# Patient Record
Sex: MALE | Race: WHITE | NOT HISPANIC OR LATINO | Employment: UNEMPLOYED | URBAN - METROPOLITAN AREA
[De-identification: names, ages, dates, MRNs, and addresses within clinical notes are randomized per-mention and may not be internally consistent; named-entity substitution may affect disease eponyms.]

---

## 2019-12-30 ENCOUNTER — HOSPITAL ENCOUNTER (INPATIENT)
Facility: REHABILITATION | Age: 69
End: 2019-12-30
Attending: PHYSICAL MEDICINE & REHABILITATION | Admitting: PHYSICAL MEDICINE & REHABILITATION

## 2020-01-31 ENCOUNTER — HOSPITAL ENCOUNTER (OUTPATIENT)
Dept: HOSPITAL 8 - CFH | Age: 70
Discharge: HOME | End: 2020-01-31
Attending: NURSE PRACTITIONER
Payer: SELF-PAY

## 2020-01-31 DIAGNOSIS — I99.8: ICD-10-CM

## 2020-01-31 DIAGNOSIS — J34.89: ICD-10-CM

## 2020-01-31 DIAGNOSIS — I63.89: Primary | ICD-10-CM

## 2020-01-31 PROCEDURE — 70450 CT HEAD/BRAIN W/O DYE: CPT

## 2020-07-24 ENCOUNTER — HOSPITAL ENCOUNTER (OUTPATIENT)
Facility: MEDICAL CENTER | Age: 70
End: 2020-07-30
Attending: EMERGENCY MEDICINE | Admitting: INTERNAL MEDICINE
Payer: MEDICAID

## 2020-07-24 ENCOUNTER — APPOINTMENT (OUTPATIENT)
Dept: RADIOLOGY | Facility: MEDICAL CENTER | Age: 70
End: 2020-07-24
Attending: EMERGENCY MEDICINE
Payer: MEDICAID

## 2020-07-24 DIAGNOSIS — R62.7 FAILURE TO THRIVE IN ADULT: ICD-10-CM

## 2020-07-24 DIAGNOSIS — R62.7 ADULT FAILURE TO THRIVE: ICD-10-CM

## 2020-07-24 DIAGNOSIS — Z86.73 HISTORY OF CVA (CEREBROVASCULAR ACCIDENT): ICD-10-CM

## 2020-07-24 LAB
ALBUMIN SERPL BCP-MCNC: 4.7 G/DL (ref 3.2–4.9)
ALBUMIN/GLOB SERPL: 2 G/DL
ALP SERPL-CCNC: 80 U/L (ref 30–99)
ALT SERPL-CCNC: 26 U/L (ref 2–50)
ANION GAP SERPL CALC-SCNC: 16 MMOL/L (ref 7–16)
APPEARANCE UR: CLEAR
AST SERPL-CCNC: 18 U/L (ref 12–45)
BASOPHILS # BLD AUTO: 0.3 % (ref 0–1.8)
BASOPHILS # BLD: 0.03 K/UL (ref 0–0.12)
BILIRUB SERPL-MCNC: 0.8 MG/DL (ref 0.1–1.5)
BILIRUB UR QL STRIP.AUTO: NEGATIVE
BUN SERPL-MCNC: 15 MG/DL (ref 8–22)
CALCIUM SERPL-MCNC: 10.1 MG/DL (ref 8.5–10.5)
CHLORIDE SERPL-SCNC: 100 MMOL/L (ref 96–112)
CO2 SERPL-SCNC: 21 MMOL/L (ref 20–33)
COLOR UR: YELLOW
CREAT SERPL-MCNC: 0.79 MG/DL (ref 0.5–1.4)
EOSINOPHIL # BLD AUTO: 0.07 K/UL (ref 0–0.51)
EOSINOPHIL NFR BLD: 0.7 % (ref 0–6.9)
ERYTHROCYTE [DISTWIDTH] IN BLOOD BY AUTOMATED COUNT: 41.3 FL (ref 35.9–50)
GLOBULIN SER CALC-MCNC: 2.4 G/DL (ref 1.9–3.5)
GLUCOSE SERPL-MCNC: 132 MG/DL (ref 65–99)
GLUCOSE UR STRIP.AUTO-MCNC: NEGATIVE MG/DL
HCT VFR BLD AUTO: 49.6 % (ref 42–52)
HGB BLD-MCNC: 16.5 G/DL (ref 14–18)
IMM GRANULOCYTES # BLD AUTO: 0.02 K/UL (ref 0–0.11)
IMM GRANULOCYTES NFR BLD AUTO: 0.2 % (ref 0–0.9)
KETONES UR STRIP.AUTO-MCNC: 40 MG/DL
LEUKOCYTE ESTERASE UR QL STRIP.AUTO: NEGATIVE
LIPASE SERPL-CCNC: 25 U/L (ref 11–82)
LYMPHOCYTES # BLD AUTO: 1.53 K/UL (ref 1–4.8)
LYMPHOCYTES NFR BLD: 15.3 % (ref 22–41)
MCH RBC QN AUTO: 28 PG (ref 27–33)
MCHC RBC AUTO-ENTMCNC: 33.3 G/DL (ref 33.7–35.3)
MCV RBC AUTO: 84.2 FL (ref 81.4–97.8)
MICRO URNS: ABNORMAL
MONOCYTES # BLD AUTO: 0.58 K/UL (ref 0–0.85)
MONOCYTES NFR BLD AUTO: 5.8 % (ref 0–13.4)
NEUTROPHILS # BLD AUTO: 7.75 K/UL (ref 1.82–7.42)
NEUTROPHILS NFR BLD: 77.7 % (ref 44–72)
NITRITE UR QL STRIP.AUTO: NEGATIVE
NRBC # BLD AUTO: 0 K/UL
NRBC BLD-RTO: 0 /100 WBC
PH UR STRIP.AUTO: 6 [PH] (ref 5–8)
PLATELET # BLD AUTO: 188 K/UL (ref 164–446)
PMV BLD AUTO: 9.8 FL (ref 9–12.9)
POC BREATHALIZER: 0 PERCENT (ref 0–0.01)
POTASSIUM SERPL-SCNC: 4.1 MMOL/L (ref 3.6–5.5)
PROT SERPL-MCNC: 7.1 G/DL (ref 6–8.2)
PROT UR QL STRIP: NEGATIVE MG/DL
RBC # BLD AUTO: 5.89 M/UL (ref 4.7–6.1)
RBC UR QL AUTO: NEGATIVE
SODIUM SERPL-SCNC: 137 MMOL/L (ref 135–145)
SP GR UR STRIP.AUTO: 1.03
UROBILINOGEN UR STRIP.AUTO-MCNC: 1 MG/DL
WBC # BLD AUTO: 10 K/UL (ref 4.8–10.8)

## 2020-07-24 PROCEDURE — 99220 PR INITIAL OBSERVATION CARE,LEVL III: CPT | Performed by: INTERNAL MEDICINE

## 2020-07-24 PROCEDURE — 85025 COMPLETE CBC W/AUTO DIFF WBC: CPT

## 2020-07-24 PROCEDURE — 83690 ASSAY OF LIPASE: CPT

## 2020-07-24 PROCEDURE — G0378 HOSPITAL OBSERVATION PER HR: HCPCS

## 2020-07-24 PROCEDURE — 81003 URINALYSIS AUTO W/O SCOPE: CPT

## 2020-07-24 PROCEDURE — 99285 EMERGENCY DEPT VISIT HI MDM: CPT

## 2020-07-24 PROCEDURE — 700105 HCHG RX REV CODE 258: Performed by: EMERGENCY MEDICINE

## 2020-07-24 PROCEDURE — 302970 POC BREATHALIZER: Performed by: EMERGENCY MEDICINE

## 2020-07-24 PROCEDURE — 302970 POC BREATHALIZER

## 2020-07-24 PROCEDURE — 80053 COMPREHEN METABOLIC PANEL: CPT

## 2020-07-24 PROCEDURE — 71045 X-RAY EXAM CHEST 1 VIEW: CPT

## 2020-07-24 PROCEDURE — C9803 HOPD COVID-19 SPEC COLLECT: HCPCS | Performed by: INTERNAL MEDICINE

## 2020-07-24 RX ORDER — AMOXICILLIN 250 MG
2 CAPSULE ORAL 2 TIMES DAILY
Status: DISCONTINUED | OUTPATIENT
Start: 2020-07-24 | End: 2020-07-30 | Stop reason: HOSPADM

## 2020-07-24 RX ORDER — SODIUM CHLORIDE 9 MG/ML
500 INJECTION, SOLUTION INTRAVENOUS ONCE
Status: COMPLETED | OUTPATIENT
Start: 2020-07-24 | End: 2020-07-24

## 2020-07-24 RX ORDER — BISACODYL 10 MG
10 SUPPOSITORY, RECTAL RECTAL
Status: DISCONTINUED | OUTPATIENT
Start: 2020-07-24 | End: 2020-07-30 | Stop reason: HOSPADM

## 2020-07-24 RX ORDER — ACETAMINOPHEN 325 MG/1
650 TABLET ORAL EVERY 6 HOURS PRN
Status: DISCONTINUED | OUTPATIENT
Start: 2020-07-24 | End: 2020-07-30 | Stop reason: HOSPADM

## 2020-07-24 RX ORDER — POLYETHYLENE GLYCOL 3350 17 G/17G
1 POWDER, FOR SOLUTION ORAL
Status: DISCONTINUED | OUTPATIENT
Start: 2020-07-24 | End: 2020-07-30 | Stop reason: HOSPADM

## 2020-07-24 RX ADMIN — SODIUM CHLORIDE 500 ML: 9 INJECTION, SOLUTION INTRAVENOUS at 18:35

## 2020-07-24 SDOH — HEALTH STABILITY: MENTAL HEALTH: HOW OFTEN DO YOU HAVE A DRINK CONTAINING ALCOHOL?: NEVER

## 2020-07-24 NOTE — ED PROVIDER NOTES
"ED Provider Note    Scribed for Laura Loving M.D. by Herve Mata. 7/24/2020  1:54 PM    Primary care provider: None reported  Means of arrival: EMS  History obtained from: Patient  History limited by: Difficulty speaking due to old stroke  CHIEF COMPLAINT  Chief Complaint   Patient presents with   • Legal 2000     unable to care for self, left AMA at [facility]  Chuy Luis is a 70 y.o. male with a reported history of stroke, who presents to the ED via EMS for inability to care for self. Per nurse, the patient has been at [facility] since the beginning of the year and has history of a stroke. RN history was obtained by EMS and the MOST team SW who placed patient on a legal hold for inability to care for self. Patient tried to escape from the facility as he does not like living there. Nurse said the patient almost made it to the highway before he was caught and placed on a legal hold. The MOST  was concerned for the patient's safety and mental capacity and his ability to make decisions for himself as he could not verbalize to her how he planned on caring for himself. He has minimal belongings with him, a walker, no money and no plan as to where he was going to get shelter, food etc.  EMS was called to transfer the patient to the ED. The patient appears to have difficulty speaking and articulating (?due to old stroke) so history is difficulty to obtain.  However, patient seems to understand questions he is being asked and tries to answer to the best of his ability.  He does well with \"yes/no\" questions.  He denies any shortness of breath. No chest pain. No dizziness. No fever, vomiting or diarrhea. No cough. No headache.  Patient denies headache and does not feel sick.  He has no wife, children, or any family in town. He does not have a home in [place]. He says he does not have any friends or anybody to call. He indicates that he is currently getting income from his social " security, but cannot tell me how much although at one point he said he gets $2000 and then on another occasion he says he gets $1000.  Patient notes he has not been drinking and eating much lately and when offered food/water, he seemed very pleased.  He also seemed to get agitated when I asked him if he was happy at [facility].  When asked if he would like to live someone else, he calmed down and seemed to agree that is a good idea.  He seems relieved when I suggested that we could find him a place to live where he might be more happy. Again, history is difficult to obtain due to speech disturbance.    Further history of present illness cannot be obtained due to the patient's difficulty speaking, likely secondary an old stroke, but no old records in the [facility] system.    REVIEW OF SYSTEMS  See HPI for further details.   Pertinent positives include: Inability to care for self.   Pertinent negatives include: He denies any shortness of breath. No chest pain. No dizziness. No fever, vomiting or diarrhea    Further ROS cannot be obtained due to the patient's difficulty speaking due to an old stroke.    PAST MEDICAL HISTORY  History reviewed. No pertinent past medical history.    FAMILY HISTORY  History reviewed. No pertinent family history.    SOCIAL HISTORY  Social History     Tobacco Use   • Smoking status: Never Smoker   • Smokeless tobacco: Never Used   Substance Use Topics   • Alcohol use: Never     Frequency: Never   • Drug use: Never      Social History     Substance and Sexual Activity   Drug Use Never       SURGICAL HISTORY  History reviewed. No pertinent surgical history.    CURRENT MEDICATIONS  Home Medications     Reviewed by Genet Jones R.N. (Registered Nurse) on 07/24/20 at 1309  Med List Status: Unable to Obtain   Medication Last Dose Status        Patient Saul Taking any Medications                       ALLERGIES  No Known Allergies    PHYSICAL EXAM  VITAL SIGNS: /82   Pulse (!) 112   Temp  "37.4 °C (99.3 °F) (Temporal)   Resp 18   Ht 1.93 m (6' 4\")   Wt 68 kg (150 lb)   SpO2 98%   BMI 18.26 kg/m²      Constitutional: Well developed, well nourished; No acute distress; Non-toxic appearance.   HENT: Normocephalic, atraumatic; Bilateral external ears normal; Oropharynx with dry mucous membranes;   Eyes: PERRL, EOMI, Conjunctiva normal. No discharge.   Neck:  Supple, nontender midline; No stridor; No nuchal rigidity.   Lymphatic: No cervical lymphadenopathy noted.   Cardiovascular: Regular rate and rhythm without murmurs, rubs, or gallop.   Thorax & Lungs: No respiratory distress, breath sounds clear to auscultation bilaterally without wheezing, rales or rhonchi. Nontender chest wall. No crepitus or subcutaneous air  Abdomen: Soft, nontender, bowel sounds normal. No obvious masses; No pulsatile masses; no rebound, guarding, or peritoneal signs.   Skin: Good color; warm and dry without rash or petechia.  Back: Nontender, No CVA tenderness.   Extremities: Distal radial, dorsalis pedis, posterior tibial pulses are equal bilaterally; No edema; Nontender calves or saphenous, No cyanosis, No clubbing.   Neurologic: Alert & oriented, Right sided weakness, which is chronic with contractures of his right upper and lower extremities. Difficulty speaking likely due to an old stroke.  Some words are intelligible and others are not intelligible.  Some statements consist of word salad.  Patient seems able to understand what I am saying.  It is easiest for him to answer yes/no questions.    LABS/RADIOLOGY/PROCEDURES  Results for orders placed or performed during the hospital encounter of 07/24/20   CBC WITH DIFFERENTIAL   Result Value Ref Range    WBC 10.0 4.8 - 10.8 K/uL    RBC 5.89 4.70 - 6.10 M/uL    Hemoglobin 16.5 14.0 - 18.0 g/dL    Hematocrit 49.6 42.0 - 52.0 %    MCV 84.2 81.4 - 97.8 fL    MCH 28.0 27.0 - 33.0 pg    MCHC 33.3 (L) 33.7 - 35.3 g/dL    RDW 41.3 35.9 - 50.0 fL    Platelet Count 188 164 - 446 K/uL "    MPV 9.8 9.0 - 12.9 fL    Neutrophils-Polys 77.70 (H) 44.00 - 72.00 %    Lymphocytes 15.30 (L) 22.00 - 41.00 %    Monocytes 5.80 0.00 - 13.40 %    Eosinophils 0.70 0.00 - 6.90 %    Basophils 0.30 0.00 - 1.80 %    Immature Granulocytes 0.20 0.00 - 0.90 %    Nucleated RBC 0.00 /100 WBC    Neutrophils (Absolute) 7.75 (H) 1.82 - 7.42 K/uL    Lymphs (Absolute) 1.53 1.00 - 4.80 K/uL    Monos (Absolute) 0.58 0.00 - 0.85 K/uL    Eos (Absolute) 0.07 0.00 - 0.51 K/uL    Baso (Absolute) 0.03 0.00 - 0.12 K/uL    Immature Granulocytes (abs) 0.02 0.00 - 0.11 K/uL    NRBC (Absolute) 0.00 K/uL   COMP METABOLIC PANEL   Result Value Ref Range    Sodium 137 135 - 145 mmol/L    Potassium 4.1 3.6 - 5.5 mmol/L    Chloride 100 96 - 112 mmol/L    Co2 21 20 - 33 mmol/L    Anion Gap 16.0 7.0 - 16.0    Glucose 132 (H) 65 - 99 mg/dL    Bun 15 8 - 22 mg/dL    Creatinine 0.79 0.50 - 1.40 mg/dL    Calcium 10.1 8.5 - 10.5 mg/dL    AST(SGOT) 18 12 - 45 U/L    ALT(SGPT) 26 2 - 50 U/L    Alkaline Phosphatase 80 30 - 99 U/L    Total Bilirubin 0.8 0.1 - 1.5 mg/dL    Albumin 4.7 3.2 - 4.9 g/dL    Total Protein 7.1 6.0 - 8.2 g/dL    Globulin 2.4 1.9 - 3.5 g/dL    A-G Ratio 2.0 g/dL   LIPASE   Result Value Ref Range    Lipase 25 11 - 82 U/L   URINALYSIS (UA)    Specimen: Urine, Clean Catch; Blood   Result Value Ref Range    Color Yellow     Character Clear     Specific Gravity 1.026 <1.035    Ph 6.0 5.0 - 8.0    Glucose Negative Negative mg/dL    Ketones 40 (A) Negative mg/dL    Protein Negative Negative mg/dL    Bilirubin Negative Negative    Urobilinogen, Urine 1.0 Negative    Nitrite Negative Negative    Leukocyte Esterase Negative Negative    Occult Blood Negative Negative    Micro Urine Req see below    ESTIMATED GFR   Result Value Ref Range    GFR If African American >60 >60 mL/min/1.73 m 2    GFR If Non African American >60 >60 mL/min/1.73 m 2   POC BREATHALIZER   Result Value Ref Range    POC Breathalizer 0.00 0.00 - 0.01 Percent          DX-CHEST-PORTABLE (1 VIEW)   Final Result      No acute cardiopulmonary disease.          COURSE & MEDICAL DECISION MAKING  Pertinent Labs & Imaging studies reviewed. (See chart for details)    Reviewed patient's old medical records which showed that the last time he was here at [facility] was 10 years ago.    1:54 PM - Patient seen and examined at bedside. Discussed plan of care.  I informed the patient the need for labs and radiology to rule out any emergent processes. Currently awaiting labs and radiology results before deciding if intervention is necessary. Patient will be informed on the results once I have reviewed them.     HYDRATION: Based on the patient's presentation of Other Tachycardia and dehydration the patient was given IV fluids. IV Hydration was used because oral hydration was not adequate alone. Upon recheck following hydration, the patient was improved.    3:01 PM Paged Hospitalist.     3:20 PM Spoke with Dr. Kaiser, hospitalist on-call, about the patient's condition.  He will kindly evaluate the patient hospitalization    3:04 PM- Spoke to social work. She said the patient was allowed to leave [facility] because he was not deemed incompetent. He also has no guardian or power of . The MOST worker did report that the patient has a history of stroke. He has a  in the community through adult protective services. The  has reached out but has not heard back from them yet. Patient could not articulate how he plans to care for himself, where he plans to go, or how he plans to pay for anything. The  feel that the patient needed to be hospitalized for placement and possible discussion of finding a guardian for the patient. Patient has no family or friends.     I verified that the patient was wearing a mask and I was wearing appropriate PPE every time I entered the room. The patient's mask was on the patient at all times during my encounter except for a brief  view of the oropharynx.    Patient presents by ambulance on a legal 2000 after he intentionally walked out of [facility] because he does not want to live there anymore.  The patient was seen by a  from the [facility] team.  The patient cannot verbalize how he was going to care for himself.  He could not realize how he was going to pay for things.  He does not have any friends or family.  He was allowed to leave [facility] because he has not been declared incompetent and does not have any power of  or guardian who they could have contacted to try to stop him.  The patient says he does not want to go back to [facility].  He does not like it there.  The patient presents with a walker and a few belongings.  Otherwise he does not have any other items with him that he could use to adequately care for himself.  He has no money.  He tells me that he gets some money from Social Security.  At one time he said it was $2000.  And on another occasion he told me it was $1000.  He has no friends and no family.  At this time I think it is reasonable to keep him on the legal hold as he has no reasonable plan as to how he is getting care for himself now that he has eloped from the [facility] facility.  He has no medical complaints.  He does not feel sick or ill.  He is not in any pain.  Patient does have a difficult time communicating due to old stroke.  He has weakness on the right side of his body.  He has some contractures of his right upper extremity and his right lower extremity.  He understands what I am saying and attempts to verbalize his answers, but some of his words are intelligible and sometimes his statements are just word salad.  I think this is likely secondary to his stroke.  He has no complaints of fevers, chills, cough, chest pain, abdominal pain, shortness of breath or headache.  He says he does not think he is eating and drinking enough.  He welcomes food and drink here in the  ER.  He got very upset when I asked him if he wanted to go back to [facility] and seemed pleased and relieved when I suggested that we try to find him a place to live where he might be happier.  The social workers have been involved in the patient's case.  He has a  through Adult Protective Services.  A voicemail has been left asking that she call back as we hopefully can get some more information from her.  However, our social workers recommend that the patient be admitted to the hospital for placement.  Our , Loreta, even suggested that we try to arrange for guardianship during this hospitalization.  This time he will remain on a legal 2000.  I discussed the case with Dr. Kaiser, hospitalist on-call, and he will kindly assign 1 of his partners evaluate the patient hospitalization.    FINAL IMPRESSION  1. Failure to thrive in adult Acute        This dictation has been created using voice recognition software. The accuracy of the dictation is limited by the abilities of the software. I expect there may be some errors of grammar and possibly content. I made every attempt to manually correct the errors within my dictation. However, errors related to voice recognition software may still exist and should be interpreted within the appropriate context.     Herve LOMELI (Tawnyaiblisa), am scribing for, and in the presence of, Laura Loving M.D..    Electronically signed by: Herve Mata (Phil), 7/24/2020    Laura LOMELI M.D. personally performed the services described in this documentation, as scribed by Herve Mata in my presence, and it is both accurate and complete. C.    The note accurately reflects work and decisions made by me.  Laura Loving M.D.  7/24/2020  3:26 PM

## 2020-07-24 NOTE — DISCHARGE PLANNING
Alysa [phone]  from the [facility] called and stated that she has been working with Sonya Cheung and getting the Pt placement in the Group Home.     Alysa will be back to work on 07-.  Please call her regarding Pt and she will work with Discharge Planning and getting the Patient into a Group Home.

## 2020-07-24 NOTE — ED NOTES
Med rec completed per pt's MAR from [facility]  Per MAR pt was not taking any daily medications at care facility   Allergies reviewed

## 2020-07-24 NOTE — ED NOTES
Chief Complaint   Patient presents with   • Legal 2000     unable to care for self, left AMA at [facility]     Pt bib ems , pt resides at [facility], pt history of stroke with right sided deficit. Pt said that he uses wheelchair. Pt [place] at [facility] , he said he does not want to live there. He does not have any family here.   PD placed pt on hold for unable to care self.

## 2020-07-24 NOTE — PROGRESS NOTES
Patient with hx of CVA residual right sided weakness and dysarthria    Patient left [facility]    On legal hold due to inability to care for self    Admit for failure to thrive and placement    Dr. Bender to admit

## 2020-07-24 NOTE — ASSESSMENT & PLAN NOTE
Patient with previous stroke since January, has right-sided hemiparesis and improving aphasia.  The patient today is very angry about the fact that he is still here and wants to leave AGAINST MEDICAL ADVICE.  Psychiatry at this point has deemed him incompetent to make medical decision making, but no legal hold in place.    7/28:  Ordered DME  since patient clearly states he is able to care for himself, [place].  Despite the patient's partial aphasia and right hemiplegia, he is quite clear that he can get food[place]  He is refusing all housing attempts.  He was brought in by EMS after seen [place] the freeway.  No suicidal or homicidal behavior.

## 2020-07-24 NOTE — DISCHARGE PLANNING
EUSEBIA had a Voice Message from the MOST Team stating Pt was coming to the ED on a Legal Hold.     Pt left AMA from [facility] . Most Team states he is unable to care for self, he does not have a place to live and he can not verbalize a plan for his care and how he will meet his needs. They are concerned over Pt mental capacity and his ability to make decisions for his self . Pt has no belongings, unable to articulate his finances, has no family support.     EUSEBIA has left message with SW at [facility] to try to obtain more information. MOST Team is attempting to contact APS to see if Pt has a current .     SW will continue to seek information on this Pt.      Most Team has notified SW that Pt has a  with APS.  Alysa Mims [phone]. EUSEBIA has left a message.

## 2020-07-24 NOTE — H&P
Hospital Medicine History & Physical Note    Date of Service  7/24/2020    Primary Care Physician  No primary care provider on file.    Code Status  No Order    Chief Complaint  Chief Complaint   Patient presents with   • Legal 2000     unable to care for self, left AMA at [facility]       History of Presenting Illness  70 y.o. male who presented to the hospital on 7/24/2020 due to unable to take care of himself.  He was brought into the hospital and he was placed on legal hold prior to his presentation and he left against medical care.  I evaluated him at the bedside I was unable to obtain HPI as patient told me that [place] and he was unable to provide me detailed information.  He has contracture on his right hand and he reported he has it for a long time.  I discussed with ER physician Dr. Loving regarding his current medical condition.  He expressed to Dr. Loving that he has not been eating food and he does not have money.    HPI is very limited as I was unable to obtain detailed information from the patient.    Review of Systems  Review of Systems   Unable to perform ROS: Mental acuity       Past Medical History  I was unable to review past medical history with patient    Surgical History  I was unable to review past surgical history with patient    Family History  I was unable to review family history with patient    Social History   reports that he has never smoked. He has never used smokeless tobacco. He reports that he does not drink alcohol or use drugs.    Allergies  No Known Allergies    Medications  None       Physical Exam  Temp:  [37.4 °C (99.3 °F)] 37.4 °C (99.3 °F)  Pulse:  [103-112] 103  Resp:  [18] 18  BP: (126-145)/(64-82) 126/64  SpO2:  [96 %-98 %] 96 %    Physical Exam  Vitals signs reviewed.   Constitutional:       General: He is not in acute distress.  HENT:      Head: Normocephalic and atraumatic. No contusion.      Right Ear: External ear normal.       Left Ear: External ear normal.      Nose: Nose normal.      Mouth/Throat:      Mouth: Mucous membranes are dry.      Pharynx: No oropharyngeal exudate.   Eyes:      General:         Right eye: No discharge.         Left eye: No discharge.      Pupils: Pupils are equal, round, and reactive to light.   Neck:      Musculoskeletal: No neck rigidity or muscular tenderness.   Cardiovascular:      Rate and Rhythm: Normal rate and regular rhythm.      Heart sounds: No murmur. No friction rub. No gallop.    Pulmonary:      Effort: Pulmonary effort is normal.      Breath sounds: No wheezing or rhonchi.   Abdominal:      General: Bowel sounds are normal. There is no distension.      Palpations: Abdomen is soft.      Tenderness: There is no abdominal tenderness. There is no rebound.   Musculoskeletal: Normal range of motion.         General: No swelling or tenderness.      Comments: Contracture on right hand   Skin:     General: Skin is warm and dry.      Coloration: Skin is not jaundiced.   Neurological:      General: No focal deficit present.      Mental Status: He is alert.      Cranial Nerves: No cranial nerve deficit.      Sensory: No sensory deficit.      Comments: Moving all his extremities   Psychiatric:         Mood and Affect: Mood normal.         Cognition and Memory: Cognition is impaired. Memory is impaired. He exhibits impaired recent memory and impaired remote memory.         Laboratory:  Recent Labs     07/24/20  1417   WBC 10.0   RBC 5.89   HEMOGLOBIN 16.5   HEMATOCRIT 49.6   MCV 84.2   MCH 28.0   MCHC 33.3*   RDW 41.3   PLATELETCT 188   MPV 9.8     Recent Labs     07/24/20  1417   SODIUM 137   POTASSIUM 4.1   CHLORIDE 100   CO2 21   GLUCOSE 132*   BUN 15   CREATININE 0.79   CALCIUM 10.1     Recent Labs     07/24/20  1417   ALTSGPT 26   ASTSGOT 18   ALKPHOSPHAT 80   TBILIRUBIN 0.8   LIPASE 25   GLUCOSE 132*         No results for input(s): NTPROBNP in the last 72 hours.      No results for input(s): TROPONINT  in the last 72 hours.    Imaging:  DX-CHEST-PORTABLE (1 VIEW)   Final Result      No acute cardiopulmonary disease.            Assessment/Plan:    Adult failure to thrive  Assessment & Plan  He left from [facility] as AMA.  He is unable to take care of himself.  Prior to his arrival to the hospital he was placed on legal hold.  I requested psychiatry evaluation.  His lab work-up did not show any acute abnormalities.  Requested PT/OT evaluation and recommendations.  I discussed case with ER physician.  Requested nutrition consult.

## 2020-07-25 PROBLEM — Z86.73 HISTORY OF CVA (CEREBROVASCULAR ACCIDENT): Status: ACTIVE | Noted: 2020-07-25

## 2020-07-25 PROCEDURE — 700102 HCHG RX REV CODE 250 W/ 637 OVERRIDE(OP): Performed by: INTERNAL MEDICINE

## 2020-07-25 PROCEDURE — A9270 NON-COVERED ITEM OR SERVICE: HCPCS | Performed by: INTERNAL MEDICINE

## 2020-07-25 PROCEDURE — 90791 PSYCH DIAGNOSTIC EVALUATION: CPT | Performed by: PSYCHOLOGIST

## 2020-07-25 PROCEDURE — 99225 PR SUBSEQUENT OBSERVATION CARE,LEVEL II: CPT | Performed by: HOSPITALIST

## 2020-07-25 PROCEDURE — G0378 HOSPITAL OBSERVATION PER HR: HCPCS

## 2020-07-25 RX ADMIN — DOCUSATE SODIUM 50 MG AND SENNOSIDES 8.6 MG 2 TABLET: 8.6; 5 TABLET, FILM COATED ORAL at 05:06

## 2020-07-25 ASSESSMENT — ENCOUNTER SYMPTOMS
GASTROINTESTINAL NEGATIVE: 1
BLOOD IN STOOL: 0
VOMITING: 0
HEMOPTYSIS: 0
DIAPHORESIS: 0
CONSTIPATION: 0
PALPITATIONS: 0
CHILLS: 0
CONSTITUTIONAL NEGATIVE: 1
FOCAL WEAKNESS: 0
NEUROLOGICAL NEGATIVE: 1
PSYCHIATRIC NEGATIVE: 1
DIARRHEA: 0
WHEEZING: 0
RESPIRATORY NEGATIVE: 1
NERVOUS/ANXIOUS: 0
HEARTBURN: 0
FEVER: 0
MUSCULOSKELETAL NEGATIVE: 1
DOUBLE VISION: 0
SEIZURES: 0
BRUISES/BLEEDS EASILY: 0
ABDOMINAL PAIN: 0
NAUSEA: 0
COUGH: 0
DIZZINESS: 0
HEADACHES: 0
EYES NEGATIVE: 1
LOSS OF CONSCIOUSNESS: 0
CARDIOVASCULAR NEGATIVE: 1

## 2020-07-25 ASSESSMENT — FIBROSIS 4 INDEX: FIB4 SCORE: 1.28

## 2020-07-25 ASSESSMENT — LIFESTYLE VARIABLES: EVER_SMOKED: NEVER

## 2020-07-25 NOTE — PROGRESS NOTES
Pt on unit at 1835.  PIV to LUE, bolus infusing as ordered.   Tele sitter. Strip bed alarm placed.

## 2020-07-25 NOTE — PROGRESS NOTES
Received report and assumed care of patient at 1900. AOx3; Legal 2000; Upx1, pivot to wheelchair; Right sided weakness, unable to actively use right arm; Expressive aphasia; Left PIV flushes, no blood return noted, SL; No complaints of pain; Telesitter in place. Bed locked and in lowest position; Alarms active and sounding; Call light and personal belongings within reach; Hourly rounding in place.

## 2020-07-25 NOTE — CARE PLAN
Problem: Safety  Goal: Will remain free from falls  Outcome: PROGRESSING AS EXPECTED  Note: Patient calls for assistance appropriately; Bed alarm active and sounding; Bed locked and in lowest position; Tele sitter in place.      Problem: Skin Integrity  Goal: Risk for impaired skin integrity will decrease  Outcome: PROGRESSING AS EXPECTED  Note: Patient turns/repositions self frequently.

## 2020-07-25 NOTE — PSYCHIATRY
BRIEF PSYCHIATRIC CONSULT NOTE: patient seen and assessed, He has severe expressive aphasia and does not make sense when he is speaking. However, he is able to answer correctly yes/no questions and when given paper to read, point to the correct answer. Orientation was assessed using multiple choice answers printed on a piece of paper. Unfortunately, likely due to his stroke, he is unable to write. He denies a history of psychiatric illness and there is nothing documented in his chart. He denies current symptoms of depression, anxiety, bipolar disorder, and schizophrenia. He denies current and past suicidal thoughts, plans to commit suicide, and intent to commit suicide. He also denies homicidal ideations.     It was unclear if the patient understood the extent of his deficits as he seemed to try to communicate his belief that he could take care of himself on the streets. This was even after this writer expressed concern about his significant issues with communication.     Given the patient's lack of mental health history and denial of current psychiatric symptoms, a legal hold is not indicated as a person can only be placed on a legal hold due to behaviors related to a psychiatric illness. LEGAL HOLD DISCONTINUED.      It appears his current presentation is more likely due to possible cognitive impairments and his severe expressive aphasia. SLP cognitive evaluation ordered to assess for cognitive impairments. Given his inability to communicate effectively, the way he presented to the hospital, and what appears to be a lack of insight into the severity of his deficits, the patient is INCAPACITATED to leave the hospital AMA.     Full note to follow.    -Legal Hold:dc'd     - Patient is INCAPACITATED to leave AMA.     -Ordered SLP evaluation to assess for any cognitive impairments      -Please continue to attempt to obtain outside records regarding patient's medical and psychiatric history (if any)    -Update to  previous plan: Signing off as this writer will be off service until 7/31/2020. Please reconsult if further questions about capacity arise.

## 2020-07-25 NOTE — PROGRESS NOTES
2 RN Skin Check    2 RN skin check complete w/Clara RN.   Devices in place: N/A.  Skin assessed under devices: N\A.  Confirmed pressure ulcers found on: N/A.  New potential pressure ulcers noted on N/A. Wound consult placed N/A.  The following interventions in place Pillows and Patient turns/repositions self frequently.      Scar noted on left cheek. Ears red and blanching. Heels red and blanching. Feet dry and flaky.

## 2020-07-25 NOTE — PSYCHIATRY
PSYCHOLOGICAL CONSULTATION:  Reason for admission: Failure to thrive in adult  Reason for consult: legal hold for inability to care for self  Requesting Physician: Polo Bender MD    Legal status: Legal Status: Involuntary LEGAL HOLD DISCONTINUED. See below    Chief Complaint: unable to obtain due to patient presentation    HPI: Chuy Luis is a 70 y.o. male with no documented mental health history who presented to the hospital BIB EMS after he left his nursing home AMA and was [place] the free[place]. Per chart review, the nursing home staff determined the patient was capacitated to leave their facility AMA which is why he was allowed to leave. The MOST team placed the patient on a legal hold for inability to care for self prior to bringing him to the ER. UDS was not taken. ETOH level was not significant. Consult was placed to evaluate patient's continued need for a legal hold.     Today, the patient presented with a euthymic affect, smiling and laughing appropriately. His speech was nonsensical as he appears to have significant expressive aphasia. However, he was able to answer simple yes/no questions and read and point to correct answers on a piece of paper. His thought processes given this ability appeared to be overal logical and linear. Orientation was assess using multiple choice answers printed on a piece of paper. He was fully oriented and denied a history of psychiatric illness. There is nothing documented in his chart indicating the patient has even been diagnosed with a psychiatric illness. He denies current and past suicidal thoughts, plans to commit suicide, and intent to commit suicide.     It was unclear if the patient understood the extent of his deficits as he seemed to try to communicate a belief that he could take care of himself on the streets. The patient made several statements seeming to indicate this belief and when this writer asked the patient if he was saying he could  "take care of himself on the streets, he stated \"yes.\" When this writer brought up her concerns that the patient would not be able to communicate effectively (as he cannot write due to his deficits) with others which could place him in danger, he laughed and shook his head no.     Risk Assessment: current symptoms as reported by pt.  Suicidal Thoughts: Denies  Plan to Commit Suicide: Denies  Intent to Commit Suicide: Denies  History of Suicidal Thoughts: Denies  History of Suicide Attempts: Denies    Homicidal Thoughts: Denies  Plan to Hurt Others: Denies  Intent to Hurt Others: Denies  History of Homicidal Thoughts or Actions: Denies      Self-Harm Thoughts: Denies  Self-Harm Actions: Denies      Psychiatric Review of Systems:current symptoms as reported by pt.  Depression: Denies   Cass: Denies   Anxiety/Panic Attacks: Denies   PTSD symptom: Denies   Psychosis: Denies        Medical Review of Systems: as reported by pt. All systems reviewed. Only those found to be + are noted below. All others are negative.   Neurological:    TBIs: Did not report, see medical chart   SZs:Did not report, see medical chart   Strokes:Endorses one 6 months ago that resulted in current deficits   Other medical symptoms:   Thyroid:Did not report, see medical chart   Diabetes: Did not report, see medical chart   Cardiovascular disease: Did not report, see medical chart    Past Psychiatric Hx: None     Family Psychiatric Hx: None reported    Social Hx:   Social History     Socioeconomic History   • Marital status: Single     Spouse name: Not on file   • Number of children: Not on file   • Years of education: Not on file   • Highest education level: Not on file   Occupational History   • Not on file   Social Needs   • Financial resource strain: Not on file   • Food insecurity     Worry: Not on file     Inability: Not on file   • Transportation needs     Medical: Not on file     Non-medical: Not on file   Tobacco Use   • Smoking status: " Never Smoker   • Smokeless tobacco: Never Used   Substance and Sexual Activity   • Alcohol use: Never     Frequency: Never   • Drug use: Never   • Sexual activity: Not on file   Lifestyle   • Physical activity     Days per week: Not on file     Minutes per session: Not on file   • Stress: Not on file   Relationships   • Social connections     Talks on phone: Not on file     Gets together: Not on file     Attends [employer] service: Not on file     Active member of club or organization: Not on file     Attends meetings of clubs or organizations: Not on file     Relationship status: Not on file   • Intimate partner violence     Fear of current or ex partner: Not on file     Emotionally abused: Not on file     Physically abused: Not on file     Forced sexual activity: Not on file   Other Topics Concern   • Not on file   Social History Narrative   • Not on file        Drug/Alcohol/Tobacco Hx:   Drugs: Denies   Prescription Medication Abuse: Denies   Alcohol: Denies   Tobacco: Denies    Medical Hx: Chart reviewed. Only those findings of potential interest to psychiatry are noted below:  History reviewed. No pertinent past medical history.  Medical Conditions:     Allergies: Patient has no known allergies.  Medications (currently prescribed at [facility]):    Current Facility-Administered Medications:   •  senna-docusate (PERICOLACE or SENOKOT S) 8.6-50 MG per tablet 2 Tab, 2 Tab, Oral, BID, 2 Tab at 07/25/20 0506 **AND** polyethylene glycol/lytes (MIRALAX) PACKET 1 Packet, 1 Packet, Oral, QDAY PRN **AND** magnesium hydroxide (MILK OF MAGNESIA) suspension 30 mL, 30 mL, Oral, QDAY PRN **AND** bisacodyl (DULCOLAX) suppository 10 mg, 10 mg, Rectal, QDAY PRN, Polo Bender M.D.  •  acetaminophen (TYLENOL) tablet 650 mg, 650 mg, Oral, Q6HRS PRN, Polo Bender M.D.  Labs:  Recent Results (from the past 24 hour(s))   POC BREATHALIZER    Collection Time: 07/24/20  1:20 PM   Result Value Ref Range    POC Breathalizer  0.00 0.00 - 0.01 Percent   CBC WITH DIFFERENTIAL    Collection Time: 07/24/20  2:17 PM   Result Value Ref Range    WBC 10.0 4.8 - 10.8 K/uL    RBC 5.89 4.70 - 6.10 M/uL    Hemoglobin 16.5 14.0 - 18.0 g/dL    Hematocrit 49.6 42.0 - 52.0 %    MCV 84.2 81.4 - 97.8 fL    MCH 28.0 27.0 - 33.0 pg    MCHC 33.3 (L) 33.7 - 35.3 g/dL    RDW 41.3 35.9 - 50.0 fL    Platelet Count 188 164 - 446 K/uL    MPV 9.8 9.0 - 12.9 fL    Neutrophils-Polys 77.70 (H) 44.00 - 72.00 %    Lymphocytes 15.30 (L) 22.00 - 41.00 %    Monocytes 5.80 0.00 - 13.40 %    Eosinophils 0.70 0.00 - 6.90 %    Basophils 0.30 0.00 - 1.80 %    Immature Granulocytes 0.20 0.00 - 0.90 %    Nucleated RBC 0.00 /100 WBC    Neutrophils (Absolute) 7.75 (H) 1.82 - 7.42 K/uL    Lymphs (Absolute) 1.53 1.00 - 4.80 K/uL    Monos (Absolute) 0.58 0.00 - 0.85 K/uL    Eos (Absolute) 0.07 0.00 - 0.51 K/uL    Baso (Absolute) 0.03 0.00 - 0.12 K/uL    Immature Granulocytes (abs) 0.02 0.00 - 0.11 K/uL    NRBC (Absolute) 0.00 K/uL   COMP METABOLIC PANEL    Collection Time: 07/24/20  2:17 PM   Result Value Ref Range    Sodium 137 135 - 145 mmol/L    Potassium 4.1 3.6 - 5.5 mmol/L    Chloride 100 96 - 112 mmol/L    Co2 21 20 - 33 mmol/L    Anion Gap 16.0 7.0 - 16.0    Glucose 132 (H) 65 - 99 mg/dL    Bun 15 8 - 22 mg/dL    Creatinine 0.79 0.50 - 1.40 mg/dL    Calcium 10.1 8.5 - 10.5 mg/dL    AST(SGOT) 18 12 - 45 U/L    ALT(SGPT) 26 2 - 50 U/L    Alkaline Phosphatase 80 30 - 99 U/L    Total Bilirubin 0.8 0.1 - 1.5 mg/dL    Albumin 4.7 3.2 - 4.9 g/dL    Total Protein 7.1 6.0 - 8.2 g/dL    Globulin 2.4 1.9 - 3.5 g/dL    A-G Ratio 2.0 g/dL   LIPASE    Collection Time: 07/24/20  2:17 PM   Result Value Ref Range    Lipase 25 11 - 82 U/L   URINALYSIS (UA)    Collection Time: 07/24/20  2:17 PM    Specimen: Urine, Clean Catch; Blood   Result Value Ref Range    Color Yellow     Character Clear     Specific Gravity 1.026 <1.035    Ph 6.0 5.0 - 8.0    Glucose Negative Negative mg/dL    Ketones 40  "(A) Negative mg/dL    Protein Negative Negative mg/dL    Bilirubin Negative Negative    Urobilinogen, Urine 1.0 Negative    Nitrite Negative Negative    Leukocyte Esterase Negative Negative    Occult Blood Negative Negative    Micro Urine Req see below    ESTIMATED GFR    Collection Time: 07/24/20  2:17 PM   Result Value Ref Range    GFR If African American >60 >60 mL/min/1.73 m 2    GFR If Non African American >60 >60 mL/min/1.73 m 2          Cranial Imaging Hx: Nothing in chart.   Other:    Psychiatric Examination:  Vitals: Blood pressure 102/66, pulse 60, temperature 36.3 °C (97.4 °F), temperature source Temporal, resp. rate 17, height 1.93 m (6' 4\"), weight 68 kg (150 lb), SpO2 97 %.  Musculoskeletal: patient has contractures and weakness from stroke, no tics or unusual mannerisms noted  Appearance and Eye Contact: appropriate dress and grooming. Behavior is calm, cooperative,  appropriate eye contact  Attention/Alertness: Alert  Thought Process: Linear and Logical    Thought Content: No psychotic processes noted  Speech: nonsensical   Mood: not stated due to patient presentation            Affect: euthymic and appropriate, laughing and smiling appropriately          SI/HI: Denies    Memory: Recent and remote memory appear intact    Orientation: alert, oriented to person, place and time  Insight into symptoms: poor  Judgement into symptoms:poor    Neuropsychological Testing:   Not formally tested. Requested speech and language cognitive testing to assess for any cognitive impairments.          ASSESSMENT:     Given the patient's lack of mental health history, denial of current psychiatric symptoms, and euthymic affect, a legal hold is not indicated as a person can only be placed on a legal hold due to behaviors related to a psychiatric condition. Therefore, the legal hold is discontinued.    However, it appears his current presentation a lack of insight into the severity of his deficits. Given his apparent lack " of insight into his deficits, his presentation to the hospital, and his severe expressive aphasia, the patient is INCAPACITATED to leave the hospital AMA.       DSM5 Diagnostic Considerations:   Unspecified Major Neurocognitive Disorder      PLAN:  Legal status: Patient IS NOT CAPACITATED to leave the hospital AMA.   Signing off as this provider is off service until Friday, 7/31/2020. Please reconsult if the questions of capacity to make discharge decisions comes up.   Records reviewed: yes  Signing off see above  Thank you for the consult.    Keara Mathews, Ph.D.

## 2020-07-25 NOTE — PROGRESS NOTES
[facility] Medicine Daily Progress Note    Date of Service  7/25/2020    Chief Complaint  70 y.o. male admitted 7/24/2020 with failure to thrive    Hospital Course    Gael is a 70-year-old gentleman who has had a previous CVA with right-sided deficits and problems with speech.  Patient says that he does not have any problem with swallowing.  When he is quiet and talks slowly and only uses a few words he is very clear and articulated.When he tries to talk fast or he tries to use complex sentence structure he gets very confused and is not able to say what he wants to say.  The patient was recently at [facility] where he left AGAINST MEDICAL ADVICE.  He has been wandering the street by himself.  With his right-sided deficits he has a very hard time caring for himself.  He was thus brought into the hospital for evaluation and was admitted because of failure to thrive situation.  He was evaluated by psychiatry who find that at this point he is incompetent to make medical decisions.  He is not on a legal hold as there is no grounds to put him on a legal hold.  But he cannot leave AGAINST MEDICAL ADVICE.  At this point we will need to try to find a suitable disposition for Gael so that he is safe.  We will be working closely with case management to try to find him a disposition that is suitable.      Interval Problem Update  Continue with nutritional support  Fall precautions  Utilize assistive devices to help him get around  Cannot leave AMA per psychiatry  Bowel protocol  Okay to leave IV out as we are at this point not using it    Consultants/Specialty  Psychiatry    Code Status  Full code    Disposition  Most likely will need to discharge to group home where he can be independent yet safe.    Review of Systems  Review of Systems   Constitutional: Negative.  Negative for chills, diaphoresis and fever.   HENT: Negative.    Eyes: Negative.  Negative for double vision.   Respiratory: Negative.  Negative for cough,  hemoptysis and wheezing.    Cardiovascular: Negative.  Negative for chest pain, palpitations and leg swelling.   Gastrointestinal: Negative.  Negative for abdominal pain, blood in stool, constipation, diarrhea, heartburn, nausea and vomiting.   Genitourinary: Negative.  Negative for frequency, hematuria and urgency.   Musculoskeletal: Negative.  Negative for joint pain.   Skin: Negative.  Negative for itching and rash.   Neurological: Negative.  Negative for dizziness, focal weakness, seizures, loss of consciousness and headaches.   Endo/Heme/Allergies: Negative.  Does not bruise/bleed easily.   Psychiatric/Behavioral: Negative.  Negative for suicidal ideas. The patient is not nervous/anxious.    All other systems reviewed and are negative.       Physical Exam  Temp:  [36.2 °C (97.1 °F)-37.4 °C (99.3 °F)] 36.3 °C (97.4 °F)  Pulse:  [] 60  Resp:  [17-18] 17  BP: (102-145)/(64-88) 102/66  SpO2:  [95 %-100 %] 97 %    Physical Exam  Vitals signs and nursing note reviewed. Exam conducted with a chaperone present.   Constitutional:       Appearance: Normal appearance. He is well-developed and normal weight.   HENT:      Head: Normocephalic and atraumatic.      Right Ear: External ear normal.      Left Ear: External ear normal.      Nose: Nose normal.      Mouth/Throat:      Mouth: Mucous membranes are moist.      Pharynx: Oropharynx is clear.   Eyes:      General: Visual field deficit (lack of vision on the right) present.      Extraocular Movements: Extraocular movements intact.      Conjunctiva/sclera: Conjunctivae normal.      Pupils: Pupils are equal, round, and reactive to light.   Neck:      Musculoskeletal: Normal range of motion and neck supple.      Thyroid: No thyromegaly.      Vascular: No JVD.   Cardiovascular:      Rate and Rhythm: Normal rate and regular rhythm.      Pulses: Normal pulses.      Heart sounds: Normal heart sounds.   Pulmonary:      Effort: Pulmonary effort is normal.      Breath sounds:  Normal breath sounds.   Chest:      Chest wall: No tenderness.   Abdominal:      General: Abdomen is flat. Bowel sounds are normal. There is no distension.      Palpations: There is no mass.      Tenderness: There is no abdominal tenderness. There is no guarding or rebound.   Musculoskeletal: Normal range of motion.   Lymphadenopathy:      Cervical: No cervical adenopathy.   Skin:     General: Skin is warm and dry.      Capillary Refill: Capillary refill takes 2 to 3 seconds.      Findings: No rash.   Neurological:      General: No focal deficit present.      Mental Status: He is alert and oriented to person, place, and time. Mental status is at baseline.      GCS: GCS eye subscore is 4. GCS verbal subscore is 3. GCS motor subscore is 6.      Cranial Nerves: Dysarthria present. No cranial nerve deficit or facial asymmetry.      Motor: Weakness and abnormal muscle tone (on the rigth) present. No atrophy.      Coordination: Coordination abnormal (on the right). Finger-Nose-Finger Test abnormal (on the right). Impaired rapid alternating movements (on the right).      Gait: Gait abnormal.      Comments: Right sided weakness that is chronic   Psychiatric:         Mood and Affect: Mood normal.         Behavior: Behavior normal.         Thought Content: Thought content normal.         Judgment: Judgment normal.         Fluids    Intake/Output Summary (Last 24 hours) at 7/25/2020 1302  Last data filed at 7/24/2020 1900  Gross per 24 hour   Intake 500 ml   Output --   Net 500 ml       Laboratory  Recent Labs     07/24/20  1417   WBC 10.0   RBC 5.89   HEMOGLOBIN 16.5   HEMATOCRIT 49.6   MCV 84.2   MCH 28.0   MCHC 33.3*   RDW 41.3   PLATELETCT 188   MPV 9.8     Recent Labs     07/24/20  1417   SODIUM 137   POTASSIUM 4.1   CHLORIDE 100   CO2 21   GLUCOSE 132*   BUN 15   CREATININE 0.79   CALCIUM 10.1                   Imaging  DX-CHEST-PORTABLE (1 VIEW)   Final Result      No acute cardiopulmonary disease.            Assessment/Plan  History of CVA (cerebrovascular accident)  Assessment & Plan  Patient continues to have aphasia as well as dysphagia.  The patient does have some residual deficits    Adult failure to thrive  Assessment & Plan  Patient was evaluated by psychiatry and deemed incompetent to make medical decisions.  The patient at this point is not allowed to leave AGAINST MEDICAL ADVICE due to his incompetence.  Patient at this point will be continued to be supported with dietary as well as physical therapy and occupational therapy  Patient will need placement in this case management will need to be contacted.           VTE prophylaxis: None patient is ambulatory and sitting in bed

## 2020-07-25 NOTE — ASSESSMENT & PLAN NOTE
Has right-sided weakness  Has aphasia  Is able to eat normally and has an excellent appetite  Concern for self safety given his current situation, however this is not a new stroke, [place]    He agrees to stay until he is able to get a wheelchair.

## 2020-07-25 NOTE — PSYCHIATRY
PSYCHIATRY CONSULT TEAM NOTE: Consult received. Patient's legal hold will be assessed within 24 hours.     Per chart review, patient has a history of a stroke with what appears to be speech deficits. I also question if the patient has associated cognitive deficits given his medical history and current presentation. Will likely order an SLP cognitive evaluation to assess for cognitive deficits.     If the patient's inability to care for self is due to cognitive deficits, a legal hold will not be appropriate as an individual cannot be placed on a legal hold for behaviors related to dementia. Instead, the question of capacity and need for guardianship will arise.    Please note that psychiatric medication management services are unavailable today, 7/25/2020.    Thank you.

## 2020-07-25 NOTE — DIETARY
"Nutrition services: Chuy Luis is a 70 y.o. male with admitting DX of FTT in adult.    Consult received for same.  Pt was a resident at [facility], but left AMA and has been hanging out in casinos.  Per notes, he had no money so has not been eating, not able to care for himself.    Dietary staff visited pt, he has expressive aphasia r/t h/o CVA but was able to pick out meals with staff.  Pt reports no difficulty with swallowing.      Assessment:  Height: 193 cm (6' 4\")  Weight: 78 kg (171 lb 15.3 oz)  Body mass index is 20.93 kg/m².  Pertinent medical history: CVA; other medical hx unknown  Diet/Intake: regular diet with % intake at lunch    Evaluation:   1. Unable to assess for malnutrition at this time.  Admit nutrition screen unable to be completed.  RD attempted to interview pt, but he was being assessed by MD.  Suspect wt loss since leaving [facility] AMA as he has been homeless, wandering the streets  2. Labs: glu 132   3. Expect good po intake to continue. Unsure how long pt has not been eating.  May want to monitor refeeding labs - daily BMP/CMP + Mg, phos    Recommendations/Plan:  1. Continue with regular diet, will have dietary staff attempt to set up snacks TID.    2. May want to check refeeding labs daily x 3-5 days as pt could be at risk for refeeding syndrome.  Replete electrolytes prn.     3. Document intake of all meals/snacks as % taken in ADL's to provide interdisciplinary communication across all shifts.   4. Monitor weight.  5. Nutrition rep will continue to see patient for ongoing meal and snack preferences.   6. RD following.                "

## 2020-07-25 NOTE — PROGRESS NOTES
Received report from [facility], assumed care of pt 0700.  Pt denies pain. He is able to use a hospital wheelchair to ambulate to bathroom.  Calls appropriately. Telesitter in use.   NO PIV, Dr. Barney aware and ok with.  All needs met at this time.

## 2020-07-25 NOTE — PROGRESS NOTES
Patient accidentally pulled out Right hand PIV. IV access not required at this time. Will address in AM.

## 2020-07-26 PROCEDURE — 700102 HCHG RX REV CODE 250 W/ 637 OVERRIDE(OP): Performed by: INTERNAL MEDICINE

## 2020-07-26 PROCEDURE — 99224 PR SUBSEQUENT OBSERVATION CARE,LEVEL I: CPT | Performed by: HOSPITALIST

## 2020-07-26 PROCEDURE — A9270 NON-COVERED ITEM OR SERVICE: HCPCS | Performed by: INTERNAL MEDICINE

## 2020-07-26 PROCEDURE — G0378 HOSPITAL OBSERVATION PER HR: HCPCS

## 2020-07-26 RX ADMIN — DOCUSATE SODIUM 50 MG AND SENNOSIDES 8.6 MG 2 TABLET: 8.6; 5 TABLET, FILM COATED ORAL at 18:00

## 2020-07-26 ASSESSMENT — ENCOUNTER SYMPTOMS
CONSTIPATION: 0
SEIZURES: 0
RESPIRATORY NEGATIVE: 1
DEPRESSION: 0
VOMITING: 0
HEADACHES: 0
DIAPHORESIS: 0
PHOTOPHOBIA: 0
EYES NEGATIVE: 1
POLYDIPSIA: 0
GASTROINTESTINAL NEGATIVE: 1
EYE REDNESS: 0
DIZZINESS: 0
INSOMNIA: 0
WEIGHT LOSS: 0
CLAUDICATION: 0
FEVER: 0
COUGH: 0
BACK PAIN: 0
NAUSEA: 0
SORE THROAT: 0
NEUROLOGICAL NEGATIVE: 1
SHORTNESS OF BREATH: 0
CONSTITUTIONAL NEGATIVE: 1
MUSCULOSKELETAL NEGATIVE: 1
WEAKNESS: 0
EYE DISCHARGE: 0
CARDIOVASCULAR NEGATIVE: 1
PSYCHIATRIC NEGATIVE: 1

## 2020-07-26 ASSESSMENT — LIFESTYLE VARIABLES: SUBSTANCE_ABUSE: 0

## 2020-07-26 NOTE — CARE PLAN
Problem: Communication  Goal: The ability to communicate needs accurately and effectively will improve  Outcome: PROGRESSING AS EXPECTED  Intervention: Collaborate with speech therapy  Note: SLP ordered by physician. Pt has expressive aphasia. Allow time for pt to be able to communicate. Yes/No questions are best.      Problem: Bowel/Gastric:  Goal: Normal bowel function is maintained or improved  Outcome: PROGRESSING AS EXPECTED  Intervention: Educate patient and significant other/support system about diet, fluid intake, medications and activity to promote bowel function  Note: Pt reporting bowel movement today.

## 2020-07-26 NOTE — CARE PLAN
Problem: Safety  Goal: Will remain free from injury  Outcome: PROGRESSING AS EXPECTED  Fall precautions in place, frequent rounding in place      Problem: Skin Integrity  Goal: Risk for impaired skin integrity will decrease  Outcome: PROGRESSING AS EXPECTED  Q shift skin checks, patient ambulatory and able to turn self

## 2020-07-26 NOTE — PROGRESS NOTES
Received report and assumed care of patient at 1900. AOx4; Upx1, pivot to wheelchair; Right sided weakness, unable to actively use right arm; Expressive aphasia; No IV access; No complaints of pain; Telesitter in place. Bed locked and in lowest position; Alarms active and sounding; Call light and personal belongings within reach; Hourly rounding in place.

## 2020-07-26 NOTE — CARE PLAN
Problem: Communication  Goal: The ability to communicate needs accurately and effectively will improve  Outcome: PROGRESSING AS EXPECTED     Problem: Safety  Goal: Will remain free from falls  Outcome: PROGRESSING AS EXPECTED     Problem: Skin Integrity  Goal: Risk for impaired skin integrity will decrease  Outcome: PROGRESSING AS EXPECTED

## 2020-07-26 NOTE — PROGRESS NOTES
[facility] Medicine Daily Progress Note    Date of Service  7/26/2020    Chief Complaint  70 y.o. male admitted 7/24/2020 with failure to thrive    Hospital Course    Gael is a 70-year-old gentleman who has had a previous CVA with right-sided deficits and problems with speech.  Patient says that he does not have any problem with swallowing.  When he is quiet and talks slowly and only uses a few words he is very clear and articulated.When he tries to talk fast or he tries to use complex sentence structure he gets very confused and is not able to say what he wants to say.  The patient was recently at [facility] [place] where he left AGAINST MEDICAL ADVICE.  He has been wandering the street by himself.  With his right-sided deficits he has a very hard time caring for himself.  He was thus brought into the hospital for evaluation and was admitted because of failure to thrive situation.  He was evaluated by psychiatry who find that at this point he is incompetent to make medical decisions.  He is not on a legal hold as there is no grounds to put him on a legal hold.  But he cannot leave AGAINST MEDICAL ADVICE.  At this point we will need to try to find a suitable disposition for Gael so that he is safe.  We will be working closely with case management to try to find him a disposition that is suitable.  7/26/2020-spoke to patient and nurse in person.  Discussed options with him.  The patient overnight had no events.  The patient at this point is eating well.  He has no complaints.  He remains with right-sided weakness from previous stroke.  He remains also with expressive aphasia.  At this point patient needs placement to group home.       Interval Problem Update  Psychiatry has evaluated the patient and deemed the patient incompetent to leave AGAINST MEDICAL ADVICE.  He is not on a legal hold.  Pending discharge to group home once a group home is located for him.    Consultants/Specialty  Psychiatry    Code Status  Full  code    Disposition  Discharge to a safe group home once  have arranged this.    Review of Systems  Review of Systems   Constitutional: Negative.  Negative for diaphoresis, fever and weight loss.   HENT: Negative.  Negative for congestion, sore throat and tinnitus.    Eyes: Negative.  Negative for photophobia, discharge and redness.   Respiratory: Negative.  Negative for cough and shortness of breath.    Cardiovascular: Negative.  Negative for chest pain, claudication and leg swelling.   Gastrointestinal: Negative.  Negative for constipation, melena, nausea and vomiting.   Genitourinary: Negative.  Negative for dysuria, frequency and hematuria.   Musculoskeletal: Negative.  Negative for back pain and joint pain.   Skin: Negative.  Negative for itching and rash.   Neurological: Negative.  Negative for dizziness, seizures, weakness and headaches.   Endo/Heme/Allergies: Negative.  Negative for environmental allergies and polydipsia.   Psychiatric/Behavioral: Negative.  Negative for depression, substance abuse and suicidal ideas. The patient does not have insomnia.    All other systems reviewed and are negative.       Physical Exam  Temp:  [36.2 °C (97.2 °F)-36.9 °C (98.4 °F)] 36.2 °C (97.2 °F)  Pulse:  [55-65] 55  Resp:  [17-18] 17  BP: (116-135)/(61-75) 116/70  SpO2:  [94 %-98 %] 97 %    Physical Exam  Vitals signs and nursing note reviewed. Exam conducted with a chaperone present.   Constitutional:       General: He is not in acute distress.     Appearance: Normal appearance. He is well-developed and normal weight. He is not ill-appearing.   HENT:      Head: Normocephalic and atraumatic.      Right Ear: External ear normal. There is no impacted cerumen.      Left Ear: External ear normal. There is no impacted cerumen.      Nose: Nose normal.      Mouth/Throat:      Mouth: Mucous membranes are moist.      Pharynx: Oropharynx is clear. No oropharyngeal exudate or posterior oropharyngeal erythema.   Eyes:       General: Visual field deficit (lack of vision on the right) present.         Right eye: No discharge.         Left eye: No discharge.      Extraocular Movements: Extraocular movements intact.      Conjunctiva/sclera: Conjunctivae normal.      Pupils: Pupils are equal, round, and reactive to light.   Neck:      Musculoskeletal: Normal range of motion and neck supple. No muscular tenderness.      Thyroid: No thyromegaly.      Vascular: No JVD.   Cardiovascular:      Rate and Rhythm: Normal rate and regular rhythm.      Pulses: Normal pulses.      Heart sounds: Normal heart sounds. No murmur. No friction rub.   Pulmonary:      Effort: Pulmonary effort is normal. No respiratory distress.      Breath sounds: Normal breath sounds. No stridor.   Abdominal:      General: Abdomen is flat. Bowel sounds are normal.   Musculoskeletal: Normal range of motion.      Right lower leg: No edema.      Left lower leg: No edema.   Lymphadenopathy:      Cervical: No cervical adenopathy.   Skin:     General: Skin is warm and dry.      Capillary Refill: Capillary refill takes 2 to 3 seconds.      Coloration: Skin is not jaundiced or pale.      Findings: No lesion or rash.   Neurological:      General: No focal deficit present.      Mental Status: He is alert and oriented to person, place, and time. Mental status is at baseline.      GCS: GCS eye subscore is 4. GCS verbal subscore is 3. GCS motor subscore is 6.      Cranial Nerves: Dysarthria present. No cranial nerve deficit or facial asymmetry.      Sensory: No sensory deficit.      Motor: Weakness and abnormal muscle tone (on the rigth) present. No atrophy.      Coordination: Coordination abnormal (on the right). Finger-Nose-Finger Test abnormal (on the right). Impaired rapid alternating movements (on the right).      Gait: Gait abnormal.      Deep Tendon Reflexes: Reflexes normal.      Comments: Right sided weakness that is chronic   Psychiatric:         Mood and Affect: Mood  normal.         Behavior: Behavior normal.         Thought Content: Thought content normal.         Judgment: Judgment normal.         Fluids    Intake/Output Summary (Last 24 hours) at 7/26/2020 1103  Last data filed at 7/25/2020 1809  Gross per 24 hour   Intake 960 ml   Output --   Net 960 ml       Laboratory  Recent Labs     07/24/20  1417   WBC 10.0   RBC 5.89   HEMOGLOBIN 16.5   HEMATOCRIT 49.6   MCV 84.2   MCH 28.0   MCHC 33.3*   RDW 41.3   PLATELETCT 188   MPV 9.8     Recent Labs     07/24/20  1417   SODIUM 137   POTASSIUM 4.1   CHLORIDE 100   CO2 21   GLUCOSE 132*   BUN 15   CREATININE 0.79   CALCIUM 10.1                   Imaging  DX-CHEST-PORTABLE (1 VIEW)   Final Result      No acute cardiopulmonary disease.           Assessment/Plan  History of CVA (cerebrovascular accident)  Assessment & Plan  With previous history of CVA the patient has right-sided deficits including upper and lower extremity.  His upper extremity is almost completely flaccid but his lower extremity has some strength in it.  The patient has severe expressive aphasia.  Because of the expressive aphasia and inability to express himself the psychiatry team at this point has put him on a non-competency hold.  The patient has not allowed to leave AGAINST MEDICAL ADVICE but he is not on a legal hold.    Adult failure to thrive  Assessment & Plan  Apparently patient left AGAINST MEDICAL ADVICE from his previous facility.  At this point patient will continue with nutritional support  Patient is pending discharge to group home once a group home is located for him.  Patient otherwise has no acute medical problems or current complaints.         VTE prophylaxis: None patient is ambulatory and sitting in bed

## 2020-07-27 PROCEDURE — 96105 ASSESSMENT OF APHASIA: CPT

## 2020-07-27 PROCEDURE — 97162 PT EVAL MOD COMPLEX 30 MIN: CPT

## 2020-07-27 PROCEDURE — G0378 HOSPITAL OBSERVATION PER HR: HCPCS

## 2020-07-27 PROCEDURE — 97166 OT EVAL MOD COMPLEX 45 MIN: CPT

## 2020-07-27 PROCEDURE — 99224 PR SUBSEQUENT OBSERVATION CARE,LEVEL I: CPT | Performed by: HOSPITALIST

## 2020-07-27 ASSESSMENT — COGNITIVE AND FUNCTIONAL STATUS - GENERAL
DRESSING REGULAR LOWER BODY CLOTHING: A LITTLE
CLIMB 3 TO 5 STEPS WITH RAILING: TOTAL
DRESSING REGULAR UPPER BODY CLOTHING: A LITTLE
DAILY ACTIVITIY SCORE: 18
TURNING FROM BACK TO SIDE WHILE IN FLAT BAD: A LOT
PERSONAL GROOMING: A LITTLE
MOVING TO AND FROM BED TO CHAIR: A LOT
STANDING UP FROM CHAIR USING ARMS: A LITTLE
MOVING FROM LYING ON BACK TO SITTING ON SIDE OF FLAT BED: A LOT
MOBILITY SCORE: 13
SUGGESTED CMS G CODE MODIFIER MOBILITY: CL
EATING MEALS: A LITTLE
HELP NEEDED FOR BATHING: A LITTLE
TOILETING: A LITTLE
SUGGESTED CMS G CODE MODIFIER DAILY ACTIVITY: CK
WALKING IN HOSPITAL ROOM: A LITTLE

## 2020-07-27 ASSESSMENT — GAIT ASSESSMENTS: GAIT LEVEL OF ASSIST: UNABLE TO PARTICIPATE

## 2020-07-27 ASSESSMENT — ACTIVITIES OF DAILY LIVING (ADL): TOILETING: INDEPENDENT

## 2020-07-27 NOTE — PROGRESS NOTES
[facility] Medicine Daily Progress Note    Date of Service  7/27/2020    Chief Complaint  70 y.o. male admitted 7/24/2020 with failure to thrive    Hospital Course    Gael is a 70-year-old gentleman who has had a previous CVA with right-sided deficits and problems with speech.  Patient says that he does not have any problem with swallowing.  When he is quiet and talks slowly and only uses a few words he is very clear and articulated.When he tries to talk fast or he tries to use complex sentence structure he gets very confused and is not able to say what he wants to say.  The patient was recently at [facility] where he left AGAINST MEDICAL ADVICE.  He has been wandering the street by himself.  With his right-sided deficits he has a very hard time caring for himself.  He was thus brought into the hospital for evaluation and was admitted because of failure to thrive situation.  He was evaluated by psychiatry who find that at this point he is incompetent to make medical decisions.  He is not on a legal hold as there is no grounds to put him on a legal hold.  But he cannot leave AGAINST MEDICAL ADVICE.  At this point we will need to try to find a suitable disposition for Gael so that he is safe.  We will be working closely with case management to try to find him a disposition that is suitable.  7/26/2020-spoke to patient and nurse in person.  Discussed options with him.  The patient overnight had no events.  The patient at this point is eating well.  He has no complaints.  He remains with right-sided weakness from previous stroke.  He remains also with expressive aphasia.  At this point patient needs placement to group home.  7/27/2020-today patient is very upset about the fact that he is not allowed to leave freely.  He says if he cannot live freely he rather just.  The patient's  apparently will be back was been working with him to get him into a group home he is not happy about that because he just wants  to live the way he wants to live.  And is unfortunately unsafe for the patient to be discharging in his current condition he has severe right-sided weakness and severe aphasia.  The patient at this point is a threat to himself and his current condition unless he has supervision will be very difficult to discharge him.       Interval Problem Update  Patient will need  assistance to find him a safe facility living.    Consultants/Specialty  Psychiatry    Code Status  Full code    Disposition  Discharge to a safe group home once  have arranged this.    Review of Systems  Review of Systems   Unable to perform ROS: Medical condition        Physical Exam  Temp:  [36.4 °C (97.6 °F)-36.6 °C (97.8 °F)] 36.6 °C (97.8 °F)  Pulse:  [57-76] 76  Resp:  [16-20] 20  BP: (100-139)/(71-79) 100/71  SpO2:  [93 %-99 %] 93 %    Physical Exam  Vitals signs and nursing note reviewed.   Constitutional:       Appearance: Normal appearance. He is well-developed and normal weight. He is not ill-appearing.   HENT:      Head: Normocephalic and atraumatic.      Nose: Congestion and rhinorrhea present.   Eyes:      Extraocular Movements: Extraocular movements intact.      Pupils: Pupils are equal, round, and reactive to light.   Neck:      Musculoskeletal: Normal range of motion and neck supple. No edema or neck rigidity.      Thyroid: No thyroid mass.      Vascular: No carotid bruit or JVD.   Cardiovascular:      Heart sounds: S1 normal and S2 normal.   Pulmonary:      Breath sounds: Normal air entry.   Musculoskeletal:      Right lower leg: No edema.      Left lower leg: No edema.      Right foot: Normal range of motion. No deformity or foot drop.      Left foot: Normal range of motion. No deformity or foot drop.   Feet:      Right foot:      Skin integrity: Skin integrity normal. No ulcer.      Left foot:      Skin integrity: Skin integrity normal. No ulcer.   Lymphadenopathy:      Head:      Right side of head: No  submental adenopathy.      Left side of head: No submental adenopathy.      Cervical: No cervical adenopathy.      Right cervical: No superficial cervical adenopathy.     Left cervical: No superficial cervical adenopathy.      Upper Body:      Right upper body: No supraclavicular adenopathy.      Left upper body: No supraclavicular adenopathy.      Lower Body: No right inguinal adenopathy. No left inguinal adenopathy.   Skin:     General: Skin is warm and dry.      Findings: No abrasion or wound.   Neurological:      Mental Status: He is alert and oriented to person, place, and time. Mental status is at baseline.      GCS: GCS eye subscore is 4. GCS verbal subscore is 3. GCS motor subscore is 6.      Cranial Nerves: Dysarthria present.      Sensory: Sensation is intact.      Motor: Motor function is intact. No weakness.      Coordination: Coordination abnormal. Finger-Nose-Finger Test abnormal.      Gait: Gait abnormal.   Psychiatric:         Attention and Perception: Attention normal.         Mood and Affect: Mood is depressed. Affect is angry.         Speech: Speech is slurred.         Behavior: Behavior is uncooperative, agitated and aggressive.         Judgment: Judgment is inappropriate.      Comments: Cognition evaluation impaired by language difficulty         Fluids    Intake/Output Summary (Last 24 hours) at 7/27/2020 1330  Last data filed at 7/27/2020 0912  Gross per 24 hour   Intake 340 ml   Output --   Net 340 ml       Laboratory  Recent Labs     07/24/20  1417   WBC 10.0   RBC 5.89   HEMOGLOBIN 16.5   HEMATOCRIT 49.6   MCV 84.2   MCH 28.0   MCHC 33.3*   RDW 41.3   PLATELETCT 188   MPV 9.8     Recent Labs     07/24/20  1417   SODIUM 137   POTASSIUM 4.1   CHLORIDE 100   CO2 21   GLUCOSE 132*   BUN 15   CREATININE 0.79   CALCIUM 10.1                   Imaging  DX-CHEST-PORTABLE (1 VIEW)   Final Result      No acute cardiopulmonary disease.           Assessment/Plan  History of CVA (cerebrovascular  accident)  Assessment & Plan  Has right-sided weakness  Has aphasia  Is able to eat normally and has an excellent appetite  Concern for self safety given his current situation and will need discharge planning through .    Adult failure to thrive  Assessment & Plan  Patient with previous stroke has a difficult time living in the community by himself with left-sided paralysis and aphasia.  The patient today is very angry about the fact that he is still here and wants to leave AGAINST MEDICAL ADVICE.  Psychiatry at this point has deemed him incompetent to leave AGAINST MEDICAL ADVICE.  The patient will need safe housing to be discharged.  Today he refuses to be examined due to the fact that he is not willing to participate if he cannot leave.         VTE prophylaxis: None patient is ambulatory and sitting in bed

## 2020-07-27 NOTE — THERAPY
Physical Therapy   Initial Evaluation     Patient Name: Chuy Luis  Age:  70 y.o., Sex:  male  Medical Record #: [id number]  Today's Date: 7/27/2020     Precautions: Fall Risk    Assessment  Patient is 70 y.o. male presenting acutely 2/2 inability to care for himself. Per chart review, pt had left SNF AMA. Pt noted to have prior CVA with R residual deficits and expressive aphasia. Pt reporting that he amb however does not have adequate strength or ROM on R to amb safely. He demonstrated good understanding of set up for transfers and was able to perform with SPV. Additionally, he was able to negotiate manual WC with SPV x100ft. Pt appears to have fxnl mob at baseline. Recommend 24/7 care for DC.     Plan    Recommend Physical Therapy for Evaluation only Patient will not be actively followed for physical therapy services at this time, however may be seen if requested by physician for 1 more visit within 30 days to address any discharge or equipment needs    Discharge recommendations:  24/7 care via long term care or group home     Objective     07/27/20 0851   Prior Living Situation   Prior Services None   Housing / Facility Homeless (per chart review, has left SNF AMA previously)   Equipment Owned (Reports had WC that was taken PTA)   Lives with - Patient's Self Care Capacity Alone and Unable to Care For Self   Prior Level of Functional Mobility   Bed Mobility Independent   Transfer Status Independent   Wheelchair Independent   Cognition    Comments followed all cues for fxnl mob, good safety awareness with SPV level for transfers, intrmittently irritated 2/2 aphasia   Passive ROM Lower Body   Comments impaired R knee extension/ankle DF 2/2 CVA residual deficits and contracturing   Active ROM Lower Body    Comments impaired R knee extension and ankle DF 2/2 weakness   Strength Lower Body   Comments LLE WFL, RLE grossly 1-2/5   Lower Body Muscle Tone   Lower Body Muscle Tone  X   Rt Lower Extremity Muscle Tone  Hypertonic;Gross Assist   Balance Assessment   Sitting Balance (Static) Fair +   Sitting Balance (Dynamic) Fair   Standing Balance (Static) Fair -   Standing Balance (Dynamic) Fair -   Gait Analysis   Gait Level Of Assist Unable to Participate  (2/2 RLE paresis)   Weight Bearing Status No restrictions noted in chart   Bed Mobility    Supine to Sit Supervised   Sit to Supine   (NT- up in chair post session)   Functional Mobility   Sit to Stand Minimal Assist   Bed, Chair, Wheelchair Transfer Minimal Assist   Anticipated Discharge Equipment   DC Equipment Wheelchair

## 2020-07-27 NOTE — THERAPY
"Occupational Therapy   Initial Evaluation     Patient Name: Chuy Luis  Age:  70 y.o., Sex:  male  Medical Record #: [id number]  Today's Date: 7/27/2020     Precautions  Precautions: Fall Risk    Assessment  Patient is 70 y.o. male with a diagnosis of adult FTT.  Additional factors influencing patient status / progress: remote CVA, homelessness, expressive/receptive aphasia, minimal community support.      Plan    Recommend Occupational Therapy 2 times per week until therapy goals are met for the following treatments:  Self Care/Activities of Daily Living, Therapeutic Activities and Therapeutic Exercises.    Discharge recommendations:  Recommend post-acute placement for additional occupational therapy services prior to discharge home.    Subjective    Frustrated throughout, requesting to \"get out of here\". Cooperative with encouragement. appears to be close to functional baseline. Will required placement in appropriate living situation     Objective       07/27/20 1001   Total Time Spent   Total Time Spent (Mins) 42   Charge Group   OT Evaluation OT Evaluation Mod   Initial Contact Note    Initial Contact Note Order Received and Verified, Occupational Therapy Evaluation in Progress with Full Report to Follow.   Prior Living Situation   Prior Services None   Housing / Facility Homeless  (left SNF AMA, homeless PTA)   Lives with - Patient's Self Care Capacity Alone and Unable to Care For Self   Comments wheelchair level   Prior Level of ADL Function   Self Feeding Independent   Grooming / Hygiene Independent   Bathing Independent   Dressing Independent   Toileting Independent   Prior Level of IADL Function   Medication Management Requires Assist   Laundry Requires Assist   Kitchen Mobility Requires Assist   Finances Requires Assist   Home Management Requires Assist   Shopping Requires Assist   Prior Level Of Mobility Uses Wheel Chair for in Home Mobility;Uses Wheel Chair for Community Mobility   Occupation " (Pre-Hospital Vocational) Not Employed   Precautions   Precautions Fall Risk   Vitals   O2 Delivery Device None - Room Air   Pain 0 - 10 Group   Therapist Pain Assessment Post Activity Pain Same as Prior to Activity;Nurse Notified;0   Cognition    Level of Consciousness Alert   Comments easily agitated, probably related to aphasia   Passive ROM Upper Body   Passive ROM Upper Body X   Comments left UE WFL. right UE contracted from remote CVA. 50% of ROM   Active ROM Upper Body   Active ROM Upper Body  X   Dominant Hand Right;Using Non-Dominant Hand   Comments right UE hypertonic and contracted, 50% AAROM noted   Strength Upper Body   Upper Body Strength  X   Comments left UE WFL, right UE Impaired   Sensation Upper Body   Upper Extremity Sensation  WDL   Upper Body Muscle Tone   Upper Body Muscle Tone  X   Rt Upper Extremity Muscle Tone Non Functional;Hypertonic;Contractures   Coordination Upper Body   Coordination X   Comments impaired right, left WFL   Balance Assessment   Sitting Balance (Static) Fair +   Sitting Balance (Dynamic) Fair +   Standing Balance (Static) Fair -   Standing Balance (Dynamic) Poor +   Weight Shift Sitting Fair   Weight Shift Standing Poor   Bed Mobility    Supine to Sit   (found seated in chair)   Sit to Supine   (returned to seated in chair)   Scooting Supervised   ADL Assessment   Eating Supervision   Grooming Supervision;Seated   Bathing Minimal Assist   Upper Body Dressing Minimal Assist   Lower Body Dressing Minimal Assist   Toileting Minimal Assist   How much help from another person does the patient currently need...   Putting on and taking off regular lower body clothing? 3   Bathing (including washing, rinsing, and drying)? 3   Toileting, which includes using a toilet, bedpan, or urinal? 3   Putting on and taking off regular upper body clothing? 3   Taking care of personal grooming such as brushing teeth? 3   Eating meals? 3   6 Clicks Daily Activity Score 18   Functional Mobility    Sit to Stand Minimal Assist   Bed, Chair, Wheelchair Transfer Minimal Assist   Toilet Transfers Minimal Assist   Transfer Method Stand Pivot   Visual Perception   Visual Perception  WDL   Activity Tolerance   Sitting in Chair ad dudley   Sitting Edge of Bed ad dudley   Patient / Family Goals   Patient / Family Goal #1 get out of here!   Short Term Goals   Short Term Goal # 1 set up for seated dressing tasks   Short Term Goal # 2 set up for seated bathing tasks   Short Term Goal # 3 supervised level for necessary functional transfers   Education Group   Role of Occupational Therapist Patient Response Patient;Acceptance;Explanation;Demonstration;Reinforcement Needed   Problem List   Problem List Decreased Active Daily Living Skills;Decreased Activity Tolerance   Anticipated Discharge Equipment   DC Equipment Unable To Determine At This Time   Interdisciplinary Plan of Care Collaboration   IDT Collaboration with  Nursing   Patient Position at End of Therapy Seated;Chair Alarm On;Call Light within Reach;Tray Table within Reach;Phone within Reach   Collaboration Comments RN aware of functional level, needs for DC   Session Information   Date / Session Number  7/27,1( 1/2,8/3)   Priority 2

## 2020-07-27 NOTE — CARE PLAN
Problem: Safety  Goal: Will remain free from injury  Outcome: PROGRESSING AS EXPECTED  Intervention: Provide assistance with mobility  Note: Bed and chair alarm in use. Call light is within reach. Pt calls for assistance with mobility. Patient is also in room near nursing station. Hourly rounding in place.      Problem: Skin Integrity  Goal: Risk for impaired skin integrity will decrease  Outcome: PROGRESSING AS EXPECTED  Intervention: Assess risk factors for impaired skin integrity and/or pressure ulcers  Note: Patient turns self while in bed and reminded to shift weight/move around while in the chair.

## 2020-07-27 NOTE — PROGRESS NOTES
Pt alert and oriented x 4, expressive aphasia, rt sided weakness, able to make needs known, SBA to ambulate to wheelchair, complaint with plan of care, cooperative with staff, po intake good, no reports of pain, VS stable afebrile

## 2020-07-27 NOTE — THERAPY
Speech Language Pathology   Initial Assessment     Patient Name: Chuy Luis  AGE:  70 y.o., SEX:  male  Medical Record #: [id number]  Today's Date: 7/27/2020          Assessment    71 y/o admitted on 7/24 after inability to care for himself/left AMA at [facility]. Not seen by SLP before at [facility]. Dx chest was unremarkable. PMH includes R sided weakness and aphasia s/p stroke. Psychiatry evaluated pt and deemed that the pt is incompetent to leave AMA, but not on legal hold.    Pt seen on this date for an aphasia evaluation. According to EMR, language deficits were from previous stroke. Pt presented with severe expressive and mild-moderate receptive language deficits. Expressive language is characterized by use of paraphasias and neologisms and speech appears fluent as noted by phonemic jargon, semblance to english syntax and phonology. Speech is echolalic at times and pt does not appear to have full insight into expressive language deficits. He completed yes/no auditory comprehension tasks with 100% accuracy, followed sequential commends with 30% accuracy, repeated words/phrases with 75% accuracy, named objects in room with 75% accuracy, and followed apraxia tasks with 60% accuracy. He was unable to write his name or address. He identified pictures with 67% accuracy, shapes with 80% accuracy, letters with 100% accuracy, numbers with 83% accuracy, and colors with 100% accuracy. Reading comprehension task presented to pt and he completed with 56% accuracy and read simple 1-step directions with 83% accuracy. Given both expressive and receptive language deficits, it is difficult to assess cognitive functioning as his ability to understand language appears to be impacted.     Plan    Unable to assess cognition alone given severe expressive and mild-moderate receptive language deficits. Would benefit from ongoing aphasia treatment during admit and at next level of care.    Recommend Speech Therapy 3 times per week until  therapy goals are met for the following treatments:  Comprehension Training, Expression Training and Patient / Family / Caregiver Education.    Discharge recommendations:  Recommend inpatient transitional care services for continued speech therapy services.           Objective       07/27/20 1057   Vitals   O2 (LPM) 0   O2 Delivery Device None - Room Air   Prior Living Situation   Housing / Facility Other (Comments)  (homeless prior to admit)   Lives with - Patient's Self Care Capacity Alone and Unable to Care For Self   Prior Level Of Function   Communication Impaired   Swallow Unknown   Hearing Within Functional Limits for Evaluation   Vision Wears Corrective Lenses   Patient's Primary Language English   Occupation (Pre-Hospital Vocational) Not Employed   Verbal Expression   Verbal Expression / Aphasia Eval (WDL) X   Vocal Quality Gurg[place]   Verbal Output Automatic Severe (2)   Verbal Output: Phrases Severe (2)   Verbal Output Conversation Severe (2)   Verbal Output Functional Severe (2)   Naming Minimal (4)  (single words)   Jargon Severe (2)   Perseverations Minimal (4)   Paraphasias Moderate (3)   Auditory Comprehension   Auditory Comprehension (WDL) X   Yes / No Questions: General Information Within Functional Limits (6-7)   Identifies Objects Minimal (4)   Identifies Pictures Moderate (3)   Identifies Body Parts Within Functional Limits (6-7)   Follows One Unit Commands Minimal (4)   Follows Two Unit Commands Moderate (3)   Follows Three Unit Commands Severe (2)   Understands Paragraph Moderate (3)   Understands Complex Conversation Moderate (3)   Reading Comprehension   Reading Comprehension (WDL) X   Verbally or Gesturally Identifies Letters Within Functional Limits (6-7)   Reading Phrases Moderate (3)   Reading Sentences Moderate (3)   Following Written Direction Minimal (4)   Written Expression   Written Expression (WDL) X   Functional Writing: Name Severe (2)   Formulates: Sentence Severe (2)  (asked to  write address)   Overall Legibility Severe (2)   Cognitive-Linguistic   Level of Consciousness Alert   Orientation Level   (difficult to assess given expressive/receptive language defi)   Short Term Goals   Short Term Goal # 1 Pt will write name and address independently given mod assistance from SLP   Short Term Goal # 2 Pt will follow 2-3 step directions with >75% accurracy and mod clinician cueing

## 2020-07-27 NOTE — PROGRESS NOTES
Assumed care of pt this am. Pt is A&O x4. Denies pain. Pt transfers from bed to chair with SBA. Pt educated to call for assistance with mobility. Pt updated on the plan of care. Hourly rounding in place.

## 2020-07-28 PROCEDURE — 700111 HCHG RX REV CODE 636 W/ 250 OVERRIDE (IP): Performed by: HOSPITALIST

## 2020-07-28 PROCEDURE — 96372 THER/PROPH/DIAG INJ SC/IM: CPT

## 2020-07-28 PROCEDURE — G0378 HOSPITAL OBSERVATION PER HR: HCPCS

## 2020-07-28 PROCEDURE — 99225 PR SUBSEQUENT OBSERVATION CARE,LEVEL II: CPT | Performed by: HOSPITALIST

## 2020-07-28 RX ORDER — ATORVASTATIN CALCIUM 40 MG/1
40 TABLET, FILM COATED ORAL EVERY EVENING
Status: DISCONTINUED | OUTPATIENT
Start: 2020-07-28 | End: 2020-07-29

## 2020-07-28 RX ORDER — HALOPERIDOL 5 MG/ML
5 INJECTION INTRAMUSCULAR EVERY 4 HOURS PRN
Status: DISCONTINUED | OUTPATIENT
Start: 2020-07-28 | End: 2020-07-30 | Stop reason: HOSPADM

## 2020-07-28 RX ADMIN — HALOPERIDOL LACTATE 5 MG: 5 INJECTION, SOLUTION INTRAMUSCULAR at 15:06

## 2020-07-28 NOTE — CARE PLAN
Problem: Safety  Goal: Will remain free from falls  Outcome: PROGRESSING AS EXPECTED  Intervention: Implement fall precautions  Flowsheets  Taken 7/27/2020 1950  Environmental Precautions:   Treaded Slipper Socks on Patient   Transferred to Stronger Side   Personal Belongings, Wastebasket, Call Bell etc. in Easy Reach   Report Given to Other Health Care Providers Regarding Fall Risk   Bed in Low Position   Communication Sign for Patients & Families   Mobility Assessed & Appropriate Sign Placed  Taken 7/27/2020 6904  Chair/Bed Strip Alarm: Yes - Alarm On     Problem: Infection  Goal: Will remain free from infection  Outcome: PROGRESSING AS EXPECTED  Intervention: Assess signs and symptoms of infection  Note: Vital signs q 8 hours. Pt is afebrile

## 2020-07-28 NOTE — PROGRESS NOTES
Pt declined ASA 81mg, education provided on importance of medication. Pt continuous to refuse medication.

## 2020-07-28 NOTE — DISCHARGE PLANNING
Anticipated Discharge Disposition: Shelter with the wheelchair    Action: Order for wheelchair received. Spoke with the patient at the bedside. The patient picked [facility].  Choice form faxed to [facility] at [phone].    Barriers to Discharge: Wheelchair.    Plan: Will continue to follow for any discharge needs/barriers.

## 2020-07-28 NOTE — CARE PLAN
Problem: Communication  Goal: The ability to communicate needs accurately and effectively will improve  Outcome: PROGRESSING AS EXPECTED     Problem: Safety  Goal: Will remain free from injury  Outcome: PROGRESSING AS EXPECTED  Note: Pt remains free from injury, Floor clear from clutter and cords.  Pt A+OX4, Non-Skid yellow socks, Bed/chair alarm off. Proper signs outside pt door in place. Door remains open.  Pt uses call light appropriately. Tele sitter in place Call light with in reach of pt.  Hourly rounding in place.   Goal: Will remain free from falls  Outcome: PROGRESSING AS EXPECTED     Problem: Psychosocial Needs:  Goal: Level of anxiety will decrease  Outcome: PROGRESSING AS EXPECTED

## 2020-07-28 NOTE — PROGRESS NOTES
"1415: 16-2 called to let us know pt was attempting to get out of bed. When nurse arrived pt on knees at edge of bed, no noted signs of any injury. Pt denies any pain or discomfort, denies hitting his head. Pt states \"I want to get out of this place.\"  Pt refusing to get off knees with assistance of nurses. Two nurses at bedside encouraging pt to get back into bed. Pt refusing \"unless it is out of this this place.\"   At 1430 charge nurse called [facility] guards to bedside to assist pt back to bed.  Pt continuous to be non compliant, guards encouraged pt to get back in to wheel chair but refused to get back into bed. \"I WANT TO LEAVE!\"  Pt thrashing right arm and leg (Left side flaccid d/t hx of stroke) when requesting pt to get back to bed, with assistance of two guards pt now in bed, but extremely non compliant with remaining in bed.   Dr Dumont aware of fall, -131/80- 20- 98% on RA, orders for Haldol 5 mg IM for agitation.  Tele sitter in place, Pt NOW High risk for falls.  Non skid socks, fall band on, hourly rounding in place, call light within reach, path free of clutter. Education provided on need to call staff for assistance with mobility and pt states understanding.  Case management aware and working on wheel chair for pt to be discharged with.   1640 Pt now resting comfortable in bed, nurse at bedside for pt safety.   "

## 2020-07-28 NOTE — DISCHARGE PLANNING
Received Choice form at 1608  Agency/Facility Name: [facility]  Referral sent per Choice form at 1612

## 2020-07-28 NOTE — FACE TO FACE
Face to Face Note  -  Durable Medical Equipment    Iraida Dumont M.D. - NPI: [id number]  I certify that this patient is under my care and that they had a durable medical equipment(DME)face to face encounter by myself that meets the physician DME face-to-face encounter requirements with this patient on:    Date of encounter:   Patient:                    MRN:                       YOB: 2018  Chuy Luis  [id number]  1950     The encounter with the patient was in whole, or in part, for the following medical condition, which is the primary reason for durable medical equipment:  CVA    I certify that, based on my findings, the following durable medical equipment is medically necessary:  Wheel Chair.    HOME O2 Saturation Measurements:(Values must be present for Home Oxygen orders)         ,     ,         My Clinical findings support the need for the above equipment due to:  Abnormal Gait, Frequent Falls    Supporting Symptoms: the patient has right hemiplegia from prior CVA and unable to ambulate without falling.

## 2020-07-28 NOTE — PROGRESS NOTES
[facility] Medicine Daily Progress Note    Date of Service  7/28/2020    Chief Complaint  70 y.o. male admitted 7/24/2020 with failure to thrive    Hospital Course    Gael is a 70-year-old gentleman who has had a previous CVA with right-sided deficits and problems with speech.  Patient says that he does not have any problem with swallowing.  When he is quiet and talks slowly and only uses a few words he is very clear and articulated.When he tries to talk fast or he tries to use complex sentence structure he gets very confused and is not able to say what he wants to say.  The patient was recently at [facility] where he left AGAINST MEDICAL ADVICE.  He has been wandering the street by himself.  With his right-sided deficits he has a very hard time caring for himself.  He was thus brought into the hospital for evaluation and was admitted because of failure to thrive situation.  He was evaluated by psychiatry who find that at this point he is incompetent to make medical decisions.  He is not on a legal hold as there is no grounds to put him on a legal hold.  But he cannot leave AGAINST MEDICAL ADVICE.  At this point we will need to try to find a suitable disposition for Gael so that he is safe.  We will be working closely with case management to try to find him a disposition that is suitable.  7/26/2020-spoke to patient and nurse in person.  Discussed options with him.  The patient overnight had no events.  The patient at this point is eating well.  He has no complaints.  He remains with right-sided weakness from previous stroke.  He remains also with expressive aphasia.  At this point patient needs placement to group home.  7/27/2020-today patient is very upset about the fact that he is not allowed to leave freely.  He says if he cannot live freely he rather just.  The patient's  apparently will be back was been working with him to get him into a group home he is not happy about that because he just wants  to live the way he wants to live.  And is unfortunately unsafe for the patient to be discharging in his current condition he has severe right-sided weakness and severe aphasia.  The patient at this point is a threat to himself and his current condition unless he has supervision will be very difficult to discharge him.  7/28:  Patient attempted to ambulate and fell onto his knees in room.  [place]  [place] was called, however he is not on a legal hold, I have ordered a wheelchair since patient has significant right hemiplegia from remote CVA.  Patient denies ever having had a stroke and refuses asa or lipitor.  Haldol IM ordered x1 to help calm patient and sitter ordered to avoid placing physical restraints.  Reviewed psychologist's note from 7/26, stating not allowed to leave AMA, however if given WC, patient is quite convincing that he knows how to get food and care for himself.  [place]         Interval Problem Update  Patient will need  assistance to find him a safe facility living.  However, patient refusing to [place]  Homeless    Consultants/Specialty  Psychiatry    Code Status  Full code    Disposition  Would need guardianship  since psychiatry states incapacitated to make medical decisions, but off legal hold.  Ordered WC through DME since this would at least help patient become independent again since adamant to remain homeless and leave AMA.    Review of Systems  Review of Systems   Unable to perform ROS: Medical condition        Physical Exam  Temp:  [36.4 °C (97.6 °F)-37.1 °C (98.7 °F)] 36.7 °C (98.1 °F)  Pulse:  [] 105  Resp:  [18-20] 20  BP: ()/(58-82) 131/80  SpO2:  [94 %-98 %] 98 %    Physical Exam  Vitals signs and nursing note reviewed.   Constitutional:       Appearance: Normal appearance. He is well-developed  and normal weight. He is not ill-appearing.   HENT:      Head: Normocephalic and atraumatic.      Nose: Congestion and rhinorrhea present.   Eyes:      Extraocular Movements: Extraocular movements intact.      Pupils: Pupils are equal, round, and reactive to light.   Neck:      Musculoskeletal: Normal range of motion and neck supple. No edema or neck rigidity.      Thyroid: No thyroid mass.      Vascular: No carotid bruit or JVD.   Cardiovascular:      Heart sounds: S1 normal and S2 normal.   Pulmonary:      Breath sounds: Normal air entry.   Musculoskeletal:      Right lower leg: No edema.      Left lower leg: No edema.      Right foot: Normal range of motion. No deformity or foot drop.      Left foot: Normal range of motion. No deformity or foot drop.   Feet:      Right foot:      Skin integrity: Skin integrity normal. No ulcer.      Left foot:      Skin integrity: Skin integrity normal. No ulcer.   Lymphadenopathy:      Head:      Right side of head: No submental adenopathy.      Left side of head: No submental adenopathy.      Cervical: No cervical adenopathy.      Right cervical: No superficial cervical adenopathy.     Left cervical: No superficial cervical adenopathy.      Upper Body:      Right upper body: No supraclavicular adenopathy.      Left upper body: No supraclavicular adenopathy.      Lower Body: No right inguinal adenopathy. No left inguinal adenopathy.   Skin:     General: Skin is warm and dry.      Findings: No abrasion or wound.   Neurological:      Mental Status: He is alert and oriented to person, place, and time. Mental status is at baseline.      GCS: GCS eye subscore is 4. GCS verbal subscore is 3. GCS motor subscore is 6.      Cranial Nerves: Dysarthria present.      Sensory: Sensation is intact.      Motor: Motor function is intact. No weakness.      Coordination: Coordination abnormal. Finger-Nose-Finger Test abnormal.      Gait: Gait abnormal.   Psychiatric:         Attention and  Perception: Attention normal.         Mood and Affect: Mood is depressed. Affect is angry.         Speech: Speech is slurred.         Behavior: Behavior is uncooperative, agitated and aggressive.         Judgment: Judgment is inappropriate.      Comments: Cognition evaluation impaired by language difficulty         Fluids    Intake/Output Summary (Last 24 hours) at 7/28/2020 1524  Last data filed at 7/28/2020 1302  Gross per 24 hour   Intake 720 ml   Output --   Net 720 ml       Laboratory                        Imaging  DX-CHEST-PORTABLE (1 VIEW)   Final Result      No acute cardiopulmonary disease.           Assessment/Plan  History of CVA (cerebrovascular accident)- (present on admission)  Assessment & Plan  Has right-sided weakness  Has aphasia  Is able to eat normally and has an excellent appetite  Concern for self safety given his current situation, however this is not a new stroke, patient [place]    He agrees to stay until he is able to get a wheelchair.    Adult failure to thrive  Assessment & Plan  Patient with previous stroke has a difficult time living homeless by himself with right-sided paralysis and aphasia.  The patient today is very angry about the fact that he is still here and wants to leave AGAINST MEDICAL ADVICE.  Psychiatry at this point has deemed him incompetent to leave AGAINST MEDICAL ADVICE.  The patient will need safe housing to be discharged.  Today he refuses to be examined due to the fact that he is not willing to participate if he cannot leave.  7/28:  Ordered DME  since patient clearly states he is able to care for himself, [place].  Despite the patient's partial aphasia and right hemiplegia, he is quite clear that he can get food, [place].  He is refusing all housing attempts.  He was brought in by EMS after seen walking toward the freeway.  No suicidal  or homicidal behavior.         VTE prophylaxis: SCDS, refuses all medications.

## 2020-07-28 NOTE — DIETARY
Brief Nutrition Update:  Patient in on a regular diet eating well (%).  No difficulty eating per MD note, only aphasia if he tries to talk too fast.  Speech has seen him for aphasia evaluation, but not swallow.    Rec:  Continue regular diet unless concerns arise for dysphagia.  Nutrition Representative will continue to see him for ongoing meal and snack preferences.    RD following per department policy.

## 2020-07-29 PROBLEM — G81.91 HEMIPARESIS, RIGHT (HCC): Status: ACTIVE | Noted: 2020-07-25

## 2020-07-29 PROCEDURE — 99217 PR OBSERVATION CARE DISCHARGE: CPT | Performed by: HOSPITALIST

## 2020-07-29 PROCEDURE — G0378 HOSPITAL OBSERVATION PER HR: HCPCS

## 2020-07-29 ASSESSMENT — PAIN SCALES - WONG BAKER
WONGBAKER_NUMERICALRESPONSE: DOESN'T HURT AT ALL

## 2020-07-29 NOTE — DISCHARGE SUMMARY
Discharge Summary    CHIEF COMPLAINT ON ADMISSION  Chief Complaint   Patient presents with   • Legal 2000     unable to care for self, left AMA at [facility]       Reason for Admission  Left AMA from SNF    Admission Date  7/24/2020    CODE STATUS  Full Code    HPI & HOSPITAL COURSE     Gael is a 70-year-old gentleman who has had a previous CVA with right-sided deficits and problems with speech.  Patient says that he does not have any problem with swallowing.  When he is quiet and talks slowly and only uses a few words he is very clear and articulated.When he tries to talk fast or he tries to use complex sentence structure he gets very confused and is not able to say what he wants to say.  The patient was recently at [facility] where he left AGAINST MEDICAL ADVICE.  He has been wandering the street by himself.  With his right-sided deficits he has a very hard time caring for himself.  He was thus brought into the hospital for evaluation and was admitted because of failure to thrive situation.  He was evaluated by psychiatry who find that at this point he is incompetent to make medical decisions.  He is not on a legal hold as there is no grounds to put him on a legal hold.  But he cannot leave AGAINST MEDICAL ADVICE.  At this point we will need to try to find a suitable disposition for Gael so that he is safe.  We will be working closely with case management to try to find him a disposition that is suitable.  7/26/2020-spoke to patient and nurse in person.  Discussed options with him.  The patient overnight had no events.  The patient at this point is eating well.  He has no complaints.  He remains with right-sided weakness from previous stroke.  He remains also with expressive aphasia.  At this point patient needs placement to group home.  7/27/2020-today patient is very upset about the fact that he is not allowed to leave freely.  He says if he cannot live freely he rather just.  The patient's   apparently will be back was been working with him to get him into a group home he is not happy about that because he just wants to live the way he wants to live.  Patient has severe right-sided weakness and improving aphasia.   7/28:  Patient attempted to ambulate and fell onto his knees in room.  He is adamant that he wants to leave and live [place].  Security was called, however he is not on a legal hold, I have ordered a wheelchair since patient has significant right hemiplegia from remote CVA.  Patient denies ever having had a stroke and refuses asa or lipitor.  Haldol IM ordered x1 to help calm patient and sitter ordered to avoid placing physical restraints.  Reviewed psychologist's note from 7/26, stating not allowed to leave AMA, however if given WC, patient is quite convincing that he knows how to get food and care for himself.  He reports a site by the [place] he has lived at for several years.  7/29:  Awaiting WC to be delivered to room.  Patient understands that the hospital is willing to get him housing, but that it will take several months.  He states he will refuse it anyway and leave the , he prefers to live by the [place].        Patient is adamant he has never had a stroke, likely he has had a traumatic brain injury in the past.  No prior head CTs in Epic and patient refusing all meds, scans and placement for housing.  Patient's aphasia is improving daily and has become clearer in speech at time of discharge.  He is A&O x3 and it is this provider's evaluation and conclusion that the patient does have mental capacity for living on his own as he has been doing for several years.  His right hemiplegia does not appear to be a new development.     Therefore, he is discharged in good and stable condition to home with close outpatient follow-up.    The patient met 2-midnight criteria for an inpatient stay at the time of discharge.    Discharge Date  7/29/20    FOLLOW UP ITEMS POST DISCHARGE  Follow up  with aging and disability EUSEBIA as an outpatient.  She has been contacted by [facility]'s inpatient SW and understands patient's adamant [place]    He demonstrates ability with PT/OT to transfer and propel himself in a wheelchair.  DME WC provided.    DISCHARGE DIAGNOSES  Active Problems:    Adult failure to thrive POA: Yes    Hemiparesis, right (HCC) POA: Yes    Aphasia POA: Unknown  Resolved Problems:    * No resolved hospital problems. *      FOLLOW UP  No future appointments.  [facility], Emergency Dept  [address] [facility] [address] [phone]  Call  As needed, If symptoms worsen      MEDICATIONS ON DISCHARGE     Medication List      You have not been prescribed any medications.         Allergies  No Known Allergies    DIET  Orders Placed This Encounter   Procedures   • Diet Order Regular     Standing Status:   Standing     Number of Occurrences:   1     Order Specific Question:   Diet:     Answer:   Regular [1]       ACTIVITY  As tolerated.  Weight bearing as tolerated    CONSULTATIONS    psychiatry    PROCEDURES  none    LABORATORY  Lab Results   Component Value Date    SODIUM 137 07/24/2020    POTASSIUM 4.1 07/24/2020    CHLORIDE 100 07/24/2020    CO2 21 07/24/2020    GLUCOSE 132 (H) 07/24/2020    BUN 15 07/24/2020    CREATININE 0.79 07/24/2020    CREATININE 0.9 09/02/2005        Lab Results   Component Value Date    WBC 10.0 07/24/2020    HEMOGLOBIN 16.5 07/24/2020    HEMATOCRIT 49.6 07/24/2020    PLATELETCT 188 07/24/2020        Total time of the discharge process exceeds 40 minutes.

## 2020-07-29 NOTE — DISCHARGE PLANNING
"Care Transition Team Assessment    Spoke with the patient at the bedside. The patient stated [place] not want to go to a SNF or group home. The patient stated \"I really want [place]\". The patient is agreeable that having a wheelchair will help him to get around to where he needs to be. Provided the patient with [facility] brochure and discussed services that they provide with the patient.    Spoke with Alysa Lassiter  with [facility] ([phone]) who has been following this patient as outpatient. Alysa stated that [place] and did not want assistance with going to the group home.    Information Source  Orientation : Oriented x 4  Information Given By: Patient         Elopement Risk  Legal Hold: No  Ambulatory or Self Mobile in Wheelchair: Yes  Disoriented: No  Psychiatric Symptoms: None  History of Wandering: Yes-at Risk for Elopement  Elopement this Admit: No  Vocalizing Wanting to Leave: Yes-At Risk for Elopement  Displays Behaviors, Body Language Wanting to Leave: No-Not at Risk for Elopement  Time of Legal Hold: 2051  Elopement Risk: At Risk for Elopement  Wanderguard On: Unavailable  Personal Belongings: Hospital Clothing Only  Environmental Precautions: (Telesitter)    Interdisciplinary Discharge Planning  Lives with - Patient's Self Care Capacity: Alone and Unable to Care For Self  Housing / Facility: Other (Comments)(homeless prior to admit)  Prior Services: None    Discharge Preparedness  What is your plan after discharge[place]  What are your discharge supports?: Other (comment)(friends)              Vision / Hearing Impairment  Vision Impairment : No  Hearing Impairment : No         Advance Directive  Advance Directive?: None  Advance Directive offered?: AD Booklet refused    Domestic Abuse  Have you ever been the victim of abuse or violence?: No  Physical Abuse or Sexual " Abuse: No  Verbal Abuse or Emotional Abuse: No  Possible Abuse Reported to:: Not Applicable         Discharge Risks or Barriers  Discharge risks or barriers?: Homeless / couch surfing  Patient risk factors: Vulnerable adult, Homeless

## 2020-07-29 NOTE — CARE PLAN
Problem: Safety  Goal: Will remain free from injury  Outcome: PROGRESSING AS EXPECTED  Note: Pt remains free from injury, Floor clear from clutter and cords.  Pt A+OX4, Non-Skid yellow socks, Bed/chair alarm on. Proper signs outside pt door in place. Door remains open.  Pt uses call light appropriately. Call light with in reach of pt.  Hourly rounding in place. Telesitter in place.  Goal: Will remain free from falls  Outcome: PROGRESSING AS EXPECTED     Problem: Infection  Goal: Will remain free from infection  Outcome: PROGRESSING AS EXPECTED  Note: Afibril     Problem: Skin Integrity  Goal: Risk for impaired skin integrity will decrease  Outcome: PROGRESSING AS EXPECTED     Problem: Pain Management  Goal: Pain level will decrease to patient's comfort goal  Outcome: PROGRESSING AS EXPECTED  Flowsheets (Taken 7/29/2020 1052)  Non Verbal Scale:   Calm   Unlabored Breathing  Burt-Andrade Scale: 0   Pain Rating Scale (NPRS): 0  Note: Denies pain.

## 2020-07-29 NOTE — CARE PLAN
Problem: Safety  Goal: Will remain free from falls  Outcome: PROGRESSING SLOWER THAN EXPECTED  Intervention: Implement fall precautions  Flowsheets  Taken 7/28/2020 2317  Bed Alarm: Yes - Alarm On  Chair/Bed Strip Alarm: Yes - Alarm On  Taken 7/28/2020 2005  Environmental Precautions:   Treaded Slipper Socks on Patient   Personal Belongings, Wastebasket, Call Bell etc. in Easy Reach   Transferred to Stronger Side   Bed in Low Position   Report Given to Other Health Care Providers Regarding Fall Risk   Communication Sign for Patients & Families   Mobility Assessed & Appropriate Sign Placed  Bedrails: Bedrails Closest to Bathroom Down  Note: Frame alarm is on     Problem: Infection  Goal: Will remain free from infection  Outcome: PROGRESSING AS EXPECTED  Intervention: Assess signs and symptoms of infection  Note: Vital signs q 8 hours. Pt is afebrile.

## 2020-07-29 NOTE — PROGRESS NOTES
Pt refused 1800 medication, education provided, pt continuous to be non compliant with medication.

## 2020-07-29 NOTE — DISCHARGE PLANNING
Spoke To: Junior  Agency/Facility Name: [facility]  Plan or Request: Wheelchair to be delivered this afternoon.

## 2020-07-30 ENCOUNTER — PATIENT OUTREACH (OUTPATIENT)
Dept: HEALTH INFORMATION MANAGEMENT | Facility: OTHER | Age: 70
End: 2020-07-30

## 2020-07-30 VITALS
OXYGEN SATURATION: 94 % | WEIGHT: 171.96 LBS | HEIGHT: 76 IN | SYSTOLIC BLOOD PRESSURE: 111 MMHG | RESPIRATION RATE: 18 BRPM | BODY MASS INDEX: 20.94 KG/M2 | DIASTOLIC BLOOD PRESSURE: 81 MMHG | HEART RATE: 88 BPM | TEMPERATURE: 99 F

## 2020-07-30 PROBLEM — R47.01 APHASIA: Status: ACTIVE | Noted: 2020-07-30

## 2020-07-30 PROCEDURE — G0378 HOSPITAL OBSERVATION PER HR: HCPCS

## 2020-07-30 NOTE — ASSESSMENT & PLAN NOTE
Patient's aphasia has improved since admission.  He is quite understandable at time of discharge.  [place].  He states he believes he can manage as long as he has a wheelchair.  He is adamant that he does not want to stay in the hospital to wait for housing, given that he would need guardianship through the state.  This could take 6 months.

## 2020-07-30 NOTE — PROGRESS NOTES
[facility] Medicine Daily Progress Note    Date of Service  7/30/2020    Chief Complaint  70 y.o. male admitted 7/24/2020 with failure to thrive    Hospital Course    Gael is a 70-year-old gentleman who has had a previous CVA with right-sided deficits and problems with speech.  Patient says that he does not have any problem with swallowing.  When he is quiet and talks slowly and only uses a few words he is very clear and articulated.When he tries to talk fast or he tries to use complex sentence structure he gets very confused and is not able to say what he wants to say.  The patient was recently at [facility] where he left AGAINST MEDICAL ADVICE.  He has been wandering the street by himself.  With his right-sided deficits he has a very hard time caring for himself.  He was thus brought into the hospital for evaluation and was admitted because of failure to thrive situation.  He was evaluated by psychiatry who find that at this point he is incompetent to make medical decisions.  He is not on a legal hold as there is no grounds to put him on a legal hold.  But he cannot leave AGAINST MEDICAL ADVICE.  At this point we will need to try to find a suitable disposition for Gael so that he is safe.  We will be working closely with case management to try to find him a disposition that is suitable.  7/26/2020-spoke to patient and nurse in person.  Discussed options with him.  The patient overnight had no events.  The patient at this point is eating well.  He has no complaints.  He remains with right-sided weakness from previous stroke.  He remains also with expressive aphasia.  At this point patient needs placement to group home.  7/27/2020-today patient is very upset about the fact that he is not allowed to leave freely.  He says if he cannot live freely he rather just.  The patient's  apparently will be back was been working with him to get him into a group home he is not happy about that because he just wants  to live the way he wants to live.  Patient has severe right-sided weakness and improving aphasia.   7/28:  Patient attempted to ambulate and fell onto his knees in room.  He is adamant that [place].  Security was called, however he is not on a legal hold, I have ordered a wheelchair since patient has significant right hemiplegia from remote CVA.  Patient denies ever having had a stroke and refuses asa or lipitor.  Haldol IM ordered x1 to help calm patient and sitter ordered to avoid placing physical restraints.  Reviewed psychologist's note from 7/26, stating not allowed to leave AMA, however if given WC, patient is quite convincing that he knows how to get food and care for himself.  [place].  7/29:  Awaiting WC to be delivered to room.  Patient understands that the hospital is willing to get him housing, but that it will take several months.  He states he will refuse it anyway and leave the GH, he [place].           Interval Problem Update  Patient will need  assistance to find him a safe facility living.  However, patient refusing to live anywhere, but [place]  Homeless    Consultants/Specialty  Psychiatry    Code Status  Full code    Disposition  Would need guardianship  since psychiatry states incapacitated to make medical decisions, but off legal hold.  Ordered WC through DME since this would at least help patient become independent again since adamant to remain homeless and leave AMA.    Review of Systems  Review of Systems   Unable to perform ROS: Medical condition        Physical Exam  Temp:  [36.2 °C (97.1 °F)-37.2 °C (99 °F)] 37.2 °C (99 °F)  Pulse:  [67-90] 88  Resp:  [18] 18  BP: (111-151)/(78-88) 111/81  SpO2:  [94 %-96 %] 94 %    Physical Exam  Vitals signs and nursing note reviewed.   Constitutional:       Appearance: Normal appearance. He is  well-developed and normal weight. He is not ill-appearing.   HENT:      Head: Normocephalic and atraumatic.      Nose: Congestion and rhinorrhea present.   Eyes:      Extraocular Movements: Extraocular movements intact.      Pupils: Pupils are equal, round, and reactive to light.   Neck:      Musculoskeletal: Normal range of motion and neck supple. No edema or neck rigidity.      Thyroid: No thyroid mass.      Vascular: No carotid bruit or JVD.   Cardiovascular:      Heart sounds: S1 normal and S2 normal.   Pulmonary:      Breath sounds: Normal air entry.   Musculoskeletal:      Right lower leg: No edema.      Left lower leg: No edema.      Right foot: Normal range of motion. No deformity or foot drop.      Left foot: Normal range of motion. No deformity or foot drop.   Feet:      Right foot:      Skin integrity: Skin integrity normal. No ulcer.      Left foot:      Skin integrity: Skin integrity normal. No ulcer.   Lymphadenopathy:      Head:      Right side of head: No submental adenopathy.      Left side of head: No submental adenopathy.      Cervical: No cervical adenopathy.      Right cervical: No superficial cervical adenopathy.     Left cervical: No superficial cervical adenopathy.      Upper Body:      Right upper body: No supraclavicular adenopathy.      Left upper body: No supraclavicular adenopathy.      Lower Body: No right inguinal adenopathy. No left inguinal adenopathy.   Skin:     General: Skin is warm and dry.      Findings: No abrasion or wound.   Neurological:      Mental Status: He is alert and oriented to person, place, and time. Mental status is at baseline.      GCS: GCS eye subscore is 4. GCS verbal subscore is 3. GCS motor subscore is 6.      Cranial Nerves: Dysarthria present.      Sensory: Sensation is intact.      Motor: Motor function is intact. No weakness.      Coordination: Coordination abnormal. Finger-Nose-Finger Test abnormal.      Gait: Gait abnormal.   Psychiatric:          Attention and Perception: Attention normal.         Mood and Affect: Mood is depressed. Affect is angry.         Speech: Speech is slurred.         Behavior: Behavior is uncooperative, agitated and aggressive.         Judgment: Judgment is inappropriate.      Comments: Cognition evaluation impaired by language difficulty         Fluids  No intake or output data in the 24 hours ending 07/30/20 1057    Laboratory                        Imaging  DX-CHEST-PORTABLE (1 VIEW)   Final Result      No acute cardiopulmonary disease.           Assessment/Plan  Aphasia  Assessment & Plan  Patient's aphasia has improved since admission.  He is quite understandable at time of discharge.  [place] that help him.  He states he believes he can manage as long as he has a wheelchair.  He is adamant that he does not want to stay in the hospital to wait for housing, given that he would need guardianship through the state.  This could take 6 months.    Hemiparesis, right (HCC)- (present on admission)  Assessment & Plan  Has right-sided weakness  Has aphasia  Is able to eat normally and has an excellent appetite  Concern for self safety given his current situation, however this is not a new stroke, patient adamant he [place].    He agrees to stay until he is able to get a wheelchair.    Adult failure to thrive- (present on admission)  Assessment & Plan  Patient with previous stroke since January, has right-sided hemiparesis and improving aphasia.  The patient today is very angry about the fact that he is still here and wants to leave AGAINST MEDICAL ADVICE.  Psychiatry at this point has deemed him incompetent to make medical decision making, but no legal hold in place.    7/28:  Ordered DME  since patient clearly states he is able to care for himself, wants to remain homeless[place].  Despite the patient's partial  aphasia and right hemiplegia, he is quite clear that he can get food, and [place].  He is refusing all housing attempts.  He was brought in by EMS after seen walking toward the freeway.  No suicidal or homicidal behavior.         VTE prophylaxis: SCDS, refuses all medications.

## 2020-07-30 NOTE — CARE PLAN
Problem: Infection  Goal: Will remain free from infection  Description: Vital signs q 8 hours. Pt is afebrile  Outcome: PROGRESSING AS EXPECTED     Problem: Mobility  Goal: Risk for activity intolerance will decrease  Outcome: PROGRESSING AS EXPECTED

## 2020-07-30 NOTE — DISCHARGE INSTRUCTIONS
Discharge Instructions    Discharged to other by wheelchair with self. Discharged via wheelchair, hospital escort: Refused.  Special equipment needed: Wheelchair    Be sure to schedule a follow-up appointment with your primary care doctor or any specialists as instructed.     Discharge Plan:   Diet Plan: Discussed  Activity Level: Discussed  Confirmed Follow up Appointment: No (Comments)  Confirmed Symptoms Management: Discussed  Medication Reconciliation Updated: Yes    I understand that a diet low in cholesterol, fat, and sodium is recommended for good health. Unless I have been given specific instructions below for another diet, I accept this instruction as my diet prescription.   Other diet: As tolerated    Special Instructions: None    · Is patient discharged on Warfarin / Coumadin?   No     Depression / Suicide Risk    As [facility] are [facility], it is important to learn how to keep safe from harming yourself.    Recognize the warning signs:  · Abrupt changes in personality, positive or negative- including increase in energy   · Giving away possessions  · Change in eating patterns- significant weight changes-  positive or negative  · Change in sleeping patterns- unable to sleep or sleeping all the time   · Unwillingness or inability to communicate  · Depression  · Unusual sadness, discouragement and loneliness  · Talk of wanting to die  · Neglect of personal appearance   · Rebelliousness- reckless behavior  · Withdrawal from people/activities they love  · Confusion- inability to concentrate     If you or a loved one observes any of these behaviors or has concerns about self-harm, here's what you can do:  · Talk about it- your feelings and reasons for harming yourself  · Remove any means that you might use to hurt yourself (examples: pills, rope, extension cords, firearm)  · Get professional help from the community (Mental Health, Substance Abuse, psychological counseling)  · Do not  be alone:Call your Safe Contact- someone whom you trust who will be there for you.  · Call your local CRISIS HOTLINE 522-1234 or 040-315-7785  · Call your local Children's Mobile Crisis Response Team [place] (085) 303-3024 or www.eoSemi  · Call the toll free National Suicide Prevention Hotlines   · National Suicide Prevention Lifeline 147-786-DTEH (9549)  · [employer] Hope Line Network 800-SUICIDE (168-7175)        Discharge Instructions    Discharged to other by wheel chair with self. Discharged via wheelchair, hospital escort: Yes.  Special equipment needed: Wheelchair    Be sure to schedule a follow-up appointment with your primary care doctor or any specialists as instructed.     Discharge Plan:   Diet Plan: Discussed  Activity Level: Discussed  Confirmed Follow up Appointment: Patient to Call and Schedule Appointment  Confirmed Symptoms Management: Discussed  Medication Reconciliation Updated: Yes    I understand that a diet low in cholesterol, fat, and sodium is recommended for good health. Unless I have been given specific instructions below for another diet, I accept this instruction as my diet prescription.   Other diet: Regular diet    Special Instructions: None    · Is patient discharged on Warfarin / Coumadin?   No     Depression / Suicide Risk    As [facility] are [facility], it is important to learn how to keep safe from harming yourself.    Recognize the warning signs:  · Abrupt changes in personality, positive or negative- including increase in energy   · Giving away possessions  · Change in eating patterns- significant weight changes-  positive or negative  · Change in sleeping patterns- unable to sleep or sleeping all the time   · Unwillingness or inability to communicate  · Depression  · Unusual sadness, discouragement and loneliness  · Talk of wanting to die  · Neglect of personal appearance   · Rebelliousness- reckless behavior  · Withdrawal from  people/activities they love  · Confusion- inability to concentrate     If you or a loved one observes any of these behaviors or has concerns about self-harm, here's what you can do:  · Talk about it- your feelings and reasons for harming yourself  · Remove any means that you might use to hurt yourself (examples: pills, rope, extension cords, firearm)  · Get professional help from the community (Mental Health, Substance Abuse, psychological counseling)  · Do not be alone:Call your Safe Contact- someone whom you trust who will be there for you.  · Call your local CRISIS HOTLINE 044-3183 or 482-371-6285  · Call your local Children's Mobile Crisis Response Team [place] (624) 302-1025 or www.CropUp  · Call the toll free National Suicide Prevention Hotlines   · National Suicide Prevention Lifeline 858-040-VBAY (6799)  · National Hope Line Network 800-SUICIDE (916-6767)

## 2020-07-30 NOTE — PROGRESS NOTES
Pt continues to adamantly refuse post hospital services stating he doesn't need help and will leave AMA. Orders placed for discharge. Wheelchair delivered. Pt provided with discharge instructions to return to ER as needed.

## 2020-07-30 NOTE — PROGRESS NOTES
Pt alert and oriented x 4. Expressive aphasia. Denies pain or nausea. No iv access. Up with SBA with pivot transfers to the wheelchair. Tolerates well. R sided weakness. Voiding well. + bowel movement. Awaiting delivery of wheelchair for discharge.  notified. Call light within reach. BA on and sounding.Hourly rounding in place.

## 2020-07-31 ENCOUNTER — APPOINTMENT (OUTPATIENT)
Dept: RADIOLOGY | Facility: MEDICAL CENTER | Age: 70
End: 2020-07-31
Attending: EMERGENCY MEDICINE
Payer: MEDICAID

## 2020-07-31 ENCOUNTER — HOSPITAL ENCOUNTER (OUTPATIENT)
Facility: MEDICAL CENTER | Age: 70
End: 2020-08-17
Attending: EMERGENCY MEDICINE | Admitting: INTERNAL MEDICINE
Payer: MEDICAID

## 2020-07-31 DIAGNOSIS — R41.82 ALTERED MENTAL STATUS, UNSPECIFIED ALTERED MENTAL STATUS TYPE: ICD-10-CM

## 2020-07-31 DIAGNOSIS — R53.1 WEAKNESS: ICD-10-CM

## 2020-07-31 PROBLEM — M62.82 RHABDOMYOLYSIS: Status: ACTIVE | Noted: 2020-07-31

## 2020-07-31 PROBLEM — G93.40 ENCEPHALOPATHY ACUTE: Status: ACTIVE | Noted: 2020-07-31

## 2020-07-31 PROBLEM — R47.01 APHASIA: Status: ACTIVE | Noted: 2020-07-31

## 2020-07-31 LAB
ALBUMIN SERPL BCP-MCNC: 4.6 G/DL (ref 3.2–4.9)
ALBUMIN/GLOB SERPL: 1.6 G/DL
ALP SERPL-CCNC: 80 U/L (ref 30–99)
ALT SERPL-CCNC: 21 U/L (ref 2–50)
ANION GAP SERPL CALC-SCNC: 17 MMOL/L (ref 7–16)
APPEARANCE UR: CLEAR
APTT PPP: 28.6 SEC (ref 24.7–36)
AST SERPL-CCNC: 27 U/L (ref 12–45)
BASOPHILS # BLD AUTO: 0.5 % (ref 0–1.8)
BASOPHILS # BLD: 0.04 K/UL (ref 0–0.12)
BILIRUB SERPL-MCNC: 1.1 MG/DL (ref 0.1–1.5)
BILIRUB UR QL STRIP.AUTO: NEGATIVE
BUN SERPL-MCNC: 21 MG/DL (ref 8–22)
CALCIUM SERPL-MCNC: 10.4 MG/DL (ref 8.5–10.5)
CHLORIDE SERPL-SCNC: 98 MMOL/L (ref 96–112)
CK SERPL-CCNC: 682 U/L (ref 0–154)
CO2 SERPL-SCNC: 19 MMOL/L (ref 20–33)
COLOR UR: YELLOW
COVID ORDER STATUS COVID19: NORMAL
CREAT SERPL-MCNC: 0.76 MG/DL (ref 0.5–1.4)
EKG IMPRESSION: NORMAL
EOSINOPHIL # BLD AUTO: 0.06 K/UL (ref 0–0.51)
EOSINOPHIL NFR BLD: 0.7 % (ref 0–6.9)
ERYTHROCYTE [DISTWIDTH] IN BLOOD BY AUTOMATED COUNT: 42.5 FL (ref 35.9–50)
EST. AVERAGE GLUCOSE BLD GHB EST-MCNC: 140 MG/DL
ETHANOL BLD-MCNC: <10.1 MG/DL (ref 0–10.1)
ETHANOL BLD-MCNC: <10.1 MG/DL (ref 0–10.1)
GLOBULIN SER CALC-MCNC: 2.9 G/DL (ref 1.9–3.5)
GLUCOSE SERPL-MCNC: 142 MG/DL (ref 65–99)
GLUCOSE UR STRIP.AUTO-MCNC: 100 MG/DL
HBA1C MFR BLD: 6.5 % (ref 0–5.6)
HCT VFR BLD AUTO: 49.2 % (ref 42–52)
HGB BLD-MCNC: 16.2 G/DL (ref 14–18)
IMM GRANULOCYTES # BLD AUTO: 0.03 K/UL (ref 0–0.11)
IMM GRANULOCYTES NFR BLD AUTO: 0.3 % (ref 0–0.9)
INR PPP: 1.11 (ref 0.87–1.13)
KETONES UR STRIP.AUTO-MCNC: 15 MG/DL
LEUKOCYTE ESTERASE UR QL STRIP.AUTO: NEGATIVE
LYMPHOCYTES # BLD AUTO: 1.74 K/UL (ref 1–4.8)
LYMPHOCYTES NFR BLD: 19.9 % (ref 22–41)
MCH RBC QN AUTO: 27.9 PG (ref 27–33)
MCHC RBC AUTO-ENTMCNC: 32.9 G/DL (ref 33.7–35.3)
MCV RBC AUTO: 84.7 FL (ref 81.4–97.8)
MICRO URNS: ABNORMAL
MONOCYTES # BLD AUTO: 0.69 K/UL (ref 0–0.85)
MONOCYTES NFR BLD AUTO: 7.9 % (ref 0–13.4)
NEUTROPHILS # BLD AUTO: 6.18 K/UL (ref 1.82–7.42)
NEUTROPHILS NFR BLD: 70.7 % (ref 44–72)
NITRITE UR QL STRIP.AUTO: NEGATIVE
NRBC # BLD AUTO: 0 K/UL
NRBC BLD-RTO: 0 /100 WBC
PH UR STRIP.AUTO: 5 [PH] (ref 5–8)
PLATELET # BLD AUTO: 194 K/UL (ref 164–446)
PMV BLD AUTO: 9.7 FL (ref 9–12.9)
POTASSIUM SERPL-SCNC: 4.1 MMOL/L (ref 3.6–5.5)
PROT SERPL-MCNC: 7.5 G/DL (ref 6–8.2)
PROT UR QL STRIP: NEGATIVE MG/DL
PROTHROMBIN TIME: 14.6 SEC (ref 12–14.6)
RBC # BLD AUTO: 5.81 M/UL (ref 4.7–6.1)
RBC UR QL AUTO: NEGATIVE
SARS-COV-2 RNA RESP QL NAA+PROBE: NOTDETECTED
SODIUM SERPL-SCNC: 134 MMOL/L (ref 135–145)
SP GR UR REFRACTOMETRY: >1.045
SPECIMEN SOURCE: NORMAL
TROPONIN T SERPL-MCNC: 15 NG/L (ref 6–19)
UROBILINOGEN UR STRIP.AUTO-MCNC: 1 MG/DL
WBC # BLD AUTO: 8.7 K/UL (ref 4.8–10.8)

## 2020-07-31 PROCEDURE — G0378 HOSPITAL OBSERVATION PER HR: HCPCS

## 2020-07-31 PROCEDURE — 70496 CT ANGIOGRAPHY HEAD: CPT

## 2020-07-31 PROCEDURE — 85730 THROMBOPLASTIN TIME PARTIAL: CPT

## 2020-07-31 PROCEDURE — 700105 HCHG RX REV CODE 258: Performed by: INTERNAL MEDICINE

## 2020-07-31 PROCEDURE — 85610 PROTHROMBIN TIME: CPT

## 2020-07-31 PROCEDURE — 70498 CT ANGIOGRAPHY NECK: CPT

## 2020-07-31 PROCEDURE — 700117 HCHG RX CONTRAST REV CODE 255: Performed by: EMERGENCY MEDICINE

## 2020-07-31 PROCEDURE — 83036 HEMOGLOBIN GLYCOSYLATED A1C: CPT

## 2020-07-31 PROCEDURE — 700111 HCHG RX REV CODE 636 W/ 250 OVERRIDE (IP): Performed by: INTERNAL MEDICINE

## 2020-07-31 PROCEDURE — 80053 COMPREHEN METABOLIC PANEL: CPT

## 2020-07-31 PROCEDURE — 84484 ASSAY OF TROPONIN QUANT: CPT

## 2020-07-31 PROCEDURE — 85025 COMPLETE CBC W/AUTO DIFF WBC: CPT

## 2020-07-31 PROCEDURE — C9803 HOPD COVID-19 SPEC COLLECT: HCPCS | Performed by: STUDENT IN AN ORGANIZED HEALTH CARE EDUCATION/TRAINING PROGRAM

## 2020-07-31 PROCEDURE — 70450 CT HEAD/BRAIN W/O DYE: CPT

## 2020-07-31 PROCEDURE — 93005 ELECTROCARDIOGRAM TRACING: CPT | Performed by: EMERGENCY MEDICINE

## 2020-07-31 PROCEDURE — 36415 COLL VENOUS BLD VENIPUNCTURE: CPT

## 2020-07-31 PROCEDURE — U0003 INFECTIOUS AGENT DETECTION BY NUCLEIC ACID (DNA OR RNA); SEVERE ACUTE RESPIRATORY SYNDROME CORONAVIRUS 2 (SARS-COV-2) (CORONAVIRUS DISEASE [COVID-19]), AMPLIFIED PROBE TECHNIQUE, MAKING USE OF HIGH THROUGHPUT TECHNOLOGIES AS DESCRIBED BY CMS-2020-01-R: HCPCS

## 2020-07-31 PROCEDURE — 82550 ASSAY OF CK (CPK): CPT

## 2020-07-31 PROCEDURE — 700102 HCHG RX REV CODE 250 W/ 637 OVERRIDE(OP): Performed by: INTERNAL MEDICINE

## 2020-07-31 PROCEDURE — 80307 DRUG TEST PRSMV CHEM ANLYZR: CPT

## 2020-07-31 PROCEDURE — 81003 URINALYSIS AUTO W/O SCOPE: CPT | Mod: XU

## 2020-07-31 PROCEDURE — 99218 PR INITIAL OBSERVATION CARE,LEVL I: CPT | Performed by: INTERNAL MEDICINE

## 2020-07-31 PROCEDURE — 99285 EMERGENCY DEPT VISIT HI MDM: CPT

## 2020-07-31 PROCEDURE — A9270 NON-COVERED ITEM OR SERVICE: HCPCS | Performed by: INTERNAL MEDICINE

## 2020-07-31 PROCEDURE — 71045 X-RAY EXAM CHEST 1 VIEW: CPT

## 2020-07-31 RX ORDER — BISACODYL 10 MG
10 SUPPOSITORY, RECTAL RECTAL
Status: DISCONTINUED | OUTPATIENT
Start: 2020-07-31 | End: 2020-08-17 | Stop reason: HOSPADM

## 2020-07-31 RX ORDER — ONDANSETRON 4 MG/1
4 TABLET, ORALLY DISINTEGRATING ORAL EVERY 4 HOURS PRN
Status: DISCONTINUED | OUTPATIENT
Start: 2020-07-31 | End: 2020-08-17 | Stop reason: HOSPADM

## 2020-07-31 RX ORDER — ONDANSETRON 2 MG/ML
4 INJECTION INTRAMUSCULAR; INTRAVENOUS EVERY 4 HOURS PRN
Status: DISCONTINUED | OUTPATIENT
Start: 2020-07-31 | End: 2020-08-17 | Stop reason: HOSPADM

## 2020-07-31 RX ORDER — ASPIRIN 81 MG/1
324 TABLET, CHEWABLE ORAL DAILY
Status: DISCONTINUED | OUTPATIENT
Start: 2020-07-31 | End: 2020-08-17 | Stop reason: HOSPADM

## 2020-07-31 RX ORDER — ACETAMINOPHEN 325 MG/1
650 TABLET ORAL EVERY 6 HOURS PRN
Status: DISCONTINUED | OUTPATIENT
Start: 2020-07-31 | End: 2020-08-17 | Stop reason: HOSPADM

## 2020-07-31 RX ORDER — POLYETHYLENE GLYCOL 3350 17 G/17G
1 POWDER, FOR SOLUTION ORAL
Status: DISCONTINUED | OUTPATIENT
Start: 2020-07-31 | End: 2020-08-17 | Stop reason: HOSPADM

## 2020-07-31 RX ORDER — ATORVASTATIN CALCIUM 80 MG/1
80 TABLET, FILM COATED ORAL EVERY EVENING
Status: DISCONTINUED | OUTPATIENT
Start: 2020-07-31 | End: 2020-08-17 | Stop reason: HOSPADM

## 2020-07-31 RX ORDER — ASPIRIN 325 MG
325 TABLET ORAL DAILY
Status: DISCONTINUED | OUTPATIENT
Start: 2020-07-31 | End: 2020-08-17 | Stop reason: HOSPADM

## 2020-07-31 RX ORDER — SODIUM CHLORIDE 9 MG/ML
INJECTION, SOLUTION INTRAVENOUS CONTINUOUS
Status: DISCONTINUED | OUTPATIENT
Start: 2020-07-31 | End: 2020-08-02

## 2020-07-31 RX ORDER — AMOXICILLIN 250 MG
2 CAPSULE ORAL 2 TIMES DAILY
Status: DISCONTINUED | OUTPATIENT
Start: 2020-07-31 | End: 2020-08-17 | Stop reason: HOSPADM

## 2020-07-31 RX ORDER — ASPIRIN 300 MG/1
300 SUPPOSITORY RECTAL DAILY
Status: DISCONTINUED | OUTPATIENT
Start: 2020-07-31 | End: 2020-08-17 | Stop reason: HOSPADM

## 2020-07-31 RX ADMIN — SODIUM CHLORIDE: 9 INJECTION, SOLUTION INTRAVENOUS at 16:56

## 2020-07-31 RX ADMIN — ENOXAPARIN SODIUM 40 MG: 40 INJECTION SUBCUTANEOUS at 16:52

## 2020-07-31 RX ADMIN — IOHEXOL 80 ML: 350 INJECTION, SOLUTION INTRAVENOUS at 10:33

## 2020-07-31 RX ADMIN — ASPIRIN 300 MG: 300 SUPPOSITORY RECTAL at 16:52

## 2020-07-31 ASSESSMENT — COGNITIVE AND FUNCTIONAL STATUS - GENERAL
DAILY ACTIVITIY SCORE: 16
SUGGESTED CMS G CODE MODIFIER DAILY ACTIVITY: CK
TURNING FROM BACK TO SIDE WHILE IN FLAT BAD: A LITTLE
DRESSING REGULAR LOWER BODY CLOTHING: A LOT
CLIMB 3 TO 5 STEPS WITH RAILING: TOTAL
WALKING IN HOSPITAL ROOM: TOTAL
SUGGESTED CMS G CODE MODIFIER MOBILITY: CL
MOVING FROM LYING ON BACK TO SITTING ON SIDE OF FLAT BED: A LITTLE
MOVING TO AND FROM BED TO CHAIR: A LITTLE
MOBILITY SCORE: 13
STANDING UP FROM CHAIR USING ARMS: A LOT
HELP NEEDED FOR BATHING: A LOT
DRESSING REGULAR UPPER BODY CLOTHING: A LOT
TOILETING: A LOT

## 2020-07-31 ASSESSMENT — LIFESTYLE VARIABLES
ALCOHOL_USE: YES
EVER_SMOKED: YES
TOTAL SCORE: 0
EVER FELT BAD OR GUILTY ABOUT YOUR DRINKING: NO
TOTAL SCORE: 0
HAVE PEOPLE ANNOYED YOU BY CRITICIZING YOUR DRINKING: NO
DOES PATIENT WANT TO STOP DRINKING: NO
HAVE YOU EVER FELT YOU SHOULD CUT DOWN ON YOUR DRINKING: NO
EVER HAD A DRINK FIRST THING IN THE MORNING TO STEADY YOUR NERVES TO GET RID OF A HANGOVER: NO
ON A TYPICAL DAY WHEN YOU DRINK ALCOHOL HOW MANY DRINKS DO YOU HAVE: 1
HOW MANY TIMES IN THE PAST YEAR HAVE YOU HAD 5 OR MORE DRINKS IN A DAY: 0
AVERAGE NUMBER OF DAYS PER WEEK YOU HAVE A DRINK CONTAINING ALCOHOL: 1
TOTAL SCORE: 0
CONSUMPTION TOTAL: NEGATIVE

## 2020-07-31 ASSESSMENT — PATIENT HEALTH QUESTIONNAIRE - PHQ9
SUM OF ALL RESPONSES TO PHQ9 QUESTIONS 1 AND 2: 0
1. LITTLE INTEREST OR PLEASURE IN DOING THINGS: NOT AT ALL
2. FEELING DOWN, DEPRESSED, IRRITABLE, OR HOPELESS: NOT AT ALL

## 2020-07-31 ASSESSMENT — COPD QUESTIONNAIRES
DO YOU EVER COUGH UP ANY MUCUS OR PHLEGM?: YES, A FEW DAYS A WEEK OR MONTH
DURING THE PAST 4 WEEKS HOW MUCH DID YOU FEEL SHORT OF BREATH: NONE/LITTLE OF THE TIME
COPD SCREENING SCORE: 3
IN THE PAST 12 MONTHS DO YOU DO LESS THAN YOU USED TO BECAUSE OF YOUR BREATHING PROBLEMS: DISAGREE/UNSURE
HAVE YOU SMOKED AT LEAST 100 CIGARETTES IN YOUR ENTIRE LIFE: NO/DON'T KNOW

## 2020-07-31 ASSESSMENT — FIBROSIS 4 INDEX: FIB4 SCORE: 2.07
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
## 2020-07-31 NOTE — ED NOTES
Med rec completed historically from med rec done on 7/24/2020  Pt is not currently taking any daily medications
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
## 2020-07-31 NOTE — ASSESSMENT & PLAN NOTE
Improving.  Not sure what his baseline mentation is  Check urine drug screen  Avoid narcotics and benzos  Monitor closely for possible underlying infection/UA/chest x-ray negative.

## 2020-08-01 ENCOUNTER — APPOINTMENT (OUTPATIENT)
Dept: RADIOLOGY | Facility: MEDICAL CENTER | Age: 70
End: 2020-08-01
Attending: STUDENT IN AN ORGANIZED HEALTH CARE EDUCATION/TRAINING PROGRAM
Payer: MEDICAID

## 2020-08-01 ENCOUNTER — APPOINTMENT (OUTPATIENT)
Dept: RADIOLOGY | Facility: MEDICAL CENTER | Age: 70
End: 2020-08-01
Attending: INTERNAL MEDICINE
Payer: MEDICAID

## 2020-08-01 ENCOUNTER — APPOINTMENT (OUTPATIENT)
Dept: CARDIOLOGY | Facility: MEDICAL CENTER | Age: 70
End: 2020-08-01
Attending: INTERNAL MEDICINE
Payer: MEDICAID

## 2020-08-01 LAB
ALBUMIN SERPL BCP-MCNC: 4 G/DL (ref 3.2–4.9)
ALBUMIN/GLOB SERPL: 1.7 G/DL
ALP SERPL-CCNC: 69 U/L (ref 30–99)
ALT SERPL-CCNC: 16 U/L (ref 2–50)
AMPHET UR QL SCN: NEGATIVE
ANION GAP SERPL CALC-SCNC: 13 MMOL/L (ref 7–16)
AST SERPL-CCNC: 22 U/L (ref 12–45)
BARBITURATES UR QL SCN: NEGATIVE
BENZODIAZ UR QL SCN: NEGATIVE
BILIRUB SERPL-MCNC: 1.1 MG/DL (ref 0.1–1.5)
BUN SERPL-MCNC: 15 MG/DL (ref 8–22)
BZE UR QL SCN: NEGATIVE
CALCIUM SERPL-MCNC: 9.3 MG/DL (ref 8.5–10.5)
CANNABINOIDS UR QL SCN: NEGATIVE
CHLORIDE SERPL-SCNC: 103 MMOL/L (ref 96–112)
CHOLEST SERPL-MCNC: 137 MG/DL (ref 100–199)
CO2 SERPL-SCNC: 21 MMOL/L (ref 20–33)
CREAT SERPL-MCNC: 0.58 MG/DL (ref 0.5–1.4)
ERYTHROCYTE [DISTWIDTH] IN BLOOD BY AUTOMATED COUNT: 43 FL (ref 35.9–50)
GLOBULIN SER CALC-MCNC: 2.4 G/DL (ref 1.9–3.5)
GLUCOSE SERPL-MCNC: 103 MG/DL (ref 65–99)
HCT VFR BLD AUTO: 46.8 % (ref 42–52)
HDLC SERPL-MCNC: 35 MG/DL
HGB BLD-MCNC: 15.1 G/DL (ref 14–18)
LDLC SERPL CALC-MCNC: 85 MG/DL
LV EJECT FRACT  99904: 55
LV EJECT FRACT MOD 2C 99903: 55.14
LV EJECT FRACT MOD 4C 99902: 57.97
LV EJECT FRACT MOD BP 99901: 54.37
MCH RBC QN AUTO: 27.5 PG (ref 27–33)
MCHC RBC AUTO-ENTMCNC: 32.3 G/DL (ref 33.7–35.3)
MCV RBC AUTO: 85.2 FL (ref 81.4–97.8)
METHADONE UR QL SCN: NEGATIVE
OPIATES UR QL SCN: NEGATIVE
OXYCODONE UR QL SCN: NEGATIVE
PCP UR QL SCN: NEGATIVE
PLATELET # BLD AUTO: 171 K/UL (ref 164–446)
PMV BLD AUTO: 9.7 FL (ref 9–12.9)
POTASSIUM SERPL-SCNC: 3.8 MMOL/L (ref 3.6–5.5)
PROPOXYPH UR QL SCN: NEGATIVE
PROT SERPL-MCNC: 6.4 G/DL (ref 6–8.2)
RBC # BLD AUTO: 5.49 M/UL (ref 4.7–6.1)
SODIUM SERPL-SCNC: 137 MMOL/L (ref 135–145)
TRIGL SERPL-MCNC: 85 MG/DL (ref 0–149)
WBC # BLD AUTO: 6.3 K/UL (ref 4.8–10.8)

## 2020-08-01 PROCEDURE — G0378 HOSPITAL OBSERVATION PER HR: HCPCS

## 2020-08-01 PROCEDURE — 700102 HCHG RX REV CODE 250 W/ 637 OVERRIDE(OP): Performed by: INTERNAL MEDICINE

## 2020-08-01 PROCEDURE — 99225 PR SUBSEQUENT OBSERVATION CARE,LEVEL II: CPT | Performed by: STUDENT IN AN ORGANIZED HEALTH CARE EDUCATION/TRAINING PROGRAM

## 2020-08-01 PROCEDURE — A9270 NON-COVERED ITEM OR SERVICE: HCPCS | Performed by: INTERNAL MEDICINE

## 2020-08-01 PROCEDURE — 92610 EVALUATE SWALLOWING FUNCTION: CPT

## 2020-08-01 PROCEDURE — 700105 HCHG RX REV CODE 258: Performed by: INTERNAL MEDICINE

## 2020-08-01 PROCEDURE — 93306 TTE W/DOPPLER COMPLETE: CPT

## 2020-08-01 PROCEDURE — 700111 HCHG RX REV CODE 636 W/ 250 OVERRIDE (IP): Performed by: INTERNAL MEDICINE

## 2020-08-01 PROCEDURE — 93306 TTE W/DOPPLER COMPLETE: CPT | Mod: 26 | Performed by: INTERNAL MEDICINE

## 2020-08-01 PROCEDURE — 80307 DRUG TEST PRSMV CHEM ANLYZR: CPT

## 2020-08-01 PROCEDURE — 36415 COLL VENOUS BLD VENIPUNCTURE: CPT

## 2020-08-01 PROCEDURE — 80061 LIPID PANEL: CPT

## 2020-08-01 PROCEDURE — 97166 OT EVAL MOD COMPLEX 45 MIN: CPT

## 2020-08-01 PROCEDURE — 85027 COMPLETE CBC AUTOMATED: CPT

## 2020-08-01 PROCEDURE — 97162 PT EVAL MOD COMPLEX 30 MIN: CPT

## 2020-08-01 PROCEDURE — 74018 RADEX ABDOMEN 1 VIEW: CPT

## 2020-08-01 PROCEDURE — 70551 MRI BRAIN STEM W/O DYE: CPT

## 2020-08-01 PROCEDURE — 80053 COMPREHEN METABOLIC PANEL: CPT

## 2020-08-01 RX ADMIN — SODIUM CHLORIDE: 9 INJECTION, SOLUTION INTRAVENOUS at 14:07

## 2020-08-01 RX ADMIN — DOCUSATE SODIUM 50 MG AND SENNOSIDES 8.6 MG 2 TABLET: 8.6; 5 TABLET, FILM COATED ORAL at 16:50

## 2020-08-01 RX ADMIN — SODIUM CHLORIDE: 9 INJECTION, SOLUTION INTRAVENOUS at 04:46

## 2020-08-01 RX ADMIN — ATORVASTATIN CALCIUM 80 MG: 80 TABLET, FILM COATED ORAL at 16:50

## 2020-08-01 RX ADMIN — ENOXAPARIN SODIUM 40 MG: 40 INJECTION SUBCUTANEOUS at 04:46

## 2020-08-01 RX ADMIN — ASPIRIN 300 MG: 300 SUPPOSITORY RECTAL at 04:46

## 2020-08-01 ASSESSMENT — COGNITIVE AND FUNCTIONAL STATUS - GENERAL
DAILY ACTIVITIY SCORE: 20
WALKING IN HOSPITAL ROOM: TOTAL
SUGGESTED CMS G CODE MODIFIER MOBILITY: CL
HELP NEEDED FOR BATHING: A LITTLE
CLIMB 3 TO 5 STEPS WITH RAILING: TOTAL
MOBILITY SCORE: 13
TURNING FROM BACK TO SIDE WHILE IN FLAT BAD: A LITTLE
TOILETING: A LITTLE
DRESSING REGULAR LOWER BODY CLOTHING: A LITTLE
MOVING FROM LYING ON BACK TO SITTING ON SIDE OF FLAT BED: A LITTLE
SUGGESTED CMS G CODE MODIFIER DAILY ACTIVITY: CJ
MOVING TO AND FROM BED TO CHAIR: A LITTLE
DRESSING REGULAR UPPER BODY CLOTHING: A LITTLE
STANDING UP FROM CHAIR USING ARMS: A LOT

## 2020-08-01 ASSESSMENT — ENCOUNTER SYMPTOMS
WEAKNESS: 0
FOCAL WEAKNESS: 0
GASTROINTESTINAL NEGATIVE: 1
PSYCHIATRIC NEGATIVE: 1
SHORTNESS OF BREATH: 0
EYES NEGATIVE: 1
SEIZURES: 0
TREMORS: 0
RESPIRATORY NEGATIVE: 1
NECK PAIN: 0
SPEECH CHANGE: 1
CARDIOVASCULAR NEGATIVE: 1
COUGH: 0
BACK PAIN: 0
WHEEZING: 0
PALPITATIONS: 0
HEADACHES: 0
DIZZINESS: 0
CONSTITUTIONAL NEGATIVE: 1

## 2020-08-01 ASSESSMENT — ACTIVITIES OF DAILY LIVING (ADL): TOILETING: REQUIRES ASSIST

## 2020-08-01 ASSESSMENT — FIBROSIS 4 INDEX: FIB4 SCORE: 2.19

## 2020-08-01 ASSESSMENT — GAIT ASSESSMENTS: GAIT LEVEL OF ASSIST: UNABLE TO PARTICIPATE

## 2020-08-01 NOTE — PROGRESS NOTES
Monitor Summary: SB 59 - SR 72, OR -.20, QRS -.10, QT -.40, with rare PVC's and rare PAC's per strip from monitor room.
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
## 2020-08-01 NOTE — PROGRESS NOTES
2 RN skin note:    Abrasion to right cheek, nose, and left jaw with dried blood. Back and sacrum is red and blanching. Heels dry with right heel having some discoloration. Skin otherwise intact.

## 2020-08-01 NOTE — CARE PLAN
Problem: Communication  Goal: The ability to communicate needs accurately and effectively will improve  Outcome: PROGRESSING AS EXPECTED  Note: Here for possible stroke. Patient confused with slurred speech but is able to verbalize needs and all needs are met by staff. Patient calm and corporative with care. Patient uses call light appropriately and call light is within reach.      Problem: Safety  Goal: Will remain free from falls  Outcome: PROGRESSING AS EXPECTED  Note: Here for possible stroke. Bed is locked in lowest position with bed alarm on. Upper side rails up. Bed alarm is on. Personal belongings and call light is within reach. Uses call light appropriately.
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
## 2020-08-01 NOTE — THERAPY
"Speech Language Pathology   Clinical Swallow Evaluation     Patient Name: Chuy Luis  AGE:  70 y.o., SEX:  male  Medical Record #: [id number]  Today's Date: 8/1/2020     Precautions: Fall Risk, Other (See Comments)  Comments: hx of CVA, hx of leaving AMA, per previous psych note does not have capacity     Assessment    Patient is 70 y.o. male \"who presented 7/31/2020 with aphasia.  The patient is a very poor historian so most of the history was taken from ERP and chart review.  The patient has another chart with MRN number [id number].  Apparently he has history of CVA recently but he left AGAINST MEDICAL ADVICE.  The patient is usually wheelchair-bound.  He was found down in the ditch and he was sent here to ER for further work-up.\" CT Head 7/31 revealed: \"Occlusion at the left MCA origin which may be chronic due to chronic infarcts of the left temporal lobe and basal ganglia. There is flow in more distal left MCA branches likely due to pial collaterals. 15 mm enhancing lesion in the floor of the right middle cranial fossa is likely extra-axial and probably a small meningioma. Brain MRI may be performed for further evaluation. CXR revealed no acute cardiopulmonary abnormality. Pt with no SLP hx for dysphagia evaluation.    Pt seen today for CSE. Pt was AAOx4 and fully participatory with therapy objectives. Pt denied hx of dysphagia, PNA; however, reported hiccoughing if he eats too quickly. Oral mech exam revealed edentulism, reduced lingual strength R>L and complete laryngeal elevation. Pt was provided with PO trials (as noted below) and demonstrated appropriate/fast feeding rate and appropriate bolus size. No overt clinical s/sx of aspiration/penetration appreciate with any consistency/texture. Pt denied globus sensation and oral cavity was clean/clear. Pt with RUE weakness, and required assistance with feeding.     At this time, recommend strict adherence to safe swallow precautions with PO diet of Soft, bite-sized " (SB6) and thin liquids (TN0) with meds per tolerance. Please hold PO if any difficulties/concerns or change in status.      Plan    Recommend Speech Therapy 5 times per week until therapy goals are met for the following treatments:  Dysphagia Training, Comprehension Training, Expression Training and Patient / Family / Caregiver Education.    Discharge recommendations:  Recommend inpatient transitional care services for continued speech therapy services.          Objective       08/01/20 1005   Prior Level Of Function   Communication Within Functional Limits   Swallow Within Functional Limits   Dentition Edentulous   Oral Motor Eval    Is Patient Able to Complete Oral Motor Eval Yes but Impaired   Labial Function   Labial Structure At Rest Within Functional Limits   Labial Vowel Production / I /, / U / Minimal   Labial Sequence / I /, / U / Minimal   Frown, Pucker Minimal   Lingual Function   Lingual Structure At Rest Within Functional Limits   Lingual Protrude Minimal   Lingual Retract Minimal   Elevate In Mouth Within Functional Limits   Elevate Outside Mouth Within Functional Limits   Lateralization No Impairment Right;No Impairment Left   Lick Lips (Circular) Within Functional Limits   Laryngeal Function   Voice Quality Within Functional Limits   Volutional Cough Within Functional Limits   Excursion Upon Swallow Complete   Oral Food Presentation   Single Swallow Thin (0) Within Functional Limits   Serial Swallow Thin (0) Within Functional Limits   Liquidised (3) Within Functional Limits   Pureed (4) Within Functional Limits   Soft & Bite-Sized (6) - (Dysphagia III) Within Functional Limits   Regular (7) Minimal  (prolonged mastication)   Self Feeding Needs Assistance   Dysphagia Strategies / Recommendations   Compensatory Strategies Monitor During Meals;Assistance Needed for Meal Tray Set-up;Head of Bed 90 Degrees During Eating / Drinking;Single Sips / Bites;No Talking During Eating / Drinking   Diet / Liquid  "Recommendation Soft & Bite-Sized (6) - (Dysphagia III);Thin (0)   Medication Administration  Whole with Liquid Wash   Therapy Interventions Dysphagia Therapy By Speech Language Pathologist   Patient / Family Goals   Patient / Family Goal #1 \"I haven't eaten in days\"   Short Term Goals   Short Term Goal # 1 Pt will consume PO diet of SB6/TN0 with no clinical s/sx of aspiration/penetration.   Anticipated Discharge Needs   Therapy Recommendations Upon DC Dysphagia Training;Patient / Family / Caregiver Education;Comprehension Training;Expression Training           "

## 2020-08-01 NOTE — CARE PLAN
Problem: Safety  Goal: Will remain free from falls  Outcome: PROGRESSING AS EXPECTED  Note: Pt provided education on using call light before getting out of bed or chair. Bed and chair alarms in place.      Problem: Communication:  Goal: The ability to communicate needs accurately and effectively will improve  Outcome: PROGRESSING SLOWER THAN EXPECTED  Note: Pt has expressive aphasia and difficulty answering questions.

## 2020-08-01 NOTE — PROGRESS NOTES
[facility] Medicine Daily Progress Note    Date of Service  8/1/2020    Chief Complaint  70 y.o. male admitted 7/31/2020 with aphasia.    Hospital Course  70 y.o. male who presented 7/31/2020 with aphasia.  The patient is a very poor historian so most of the history was taken from ERP and chart review.  The patient has another chart with MRN number [id number].  Apparently he has history of CVA and was recently in the hospital. The patient is usually wheelchair-bound and with chronic right upper extremity weakness. He was found down in the ditch and he was sent here to ER for further work-up.  In the ER CT scan of the head was done with no acute finding.  He will be admitted for further work-up.    Interval Problem Update  No acute events overnight.  Patient with some mild dysarthria but alert oriented x1.  States he was brought to the hospital because he had a fall and had some facial abrasions.  Currently pending an MRI/echo/PT/OT evaluation.  State he is currently homeless.    Consultants/Specialty  None    Code Status  Full    Disposition  Will need placement as patient is homeless.    Review of Systems  Review of Systems   Constitutional: Negative.    HENT: Negative.    Eyes: Negative.    Respiratory: Negative.  Negative for cough, shortness of breath and wheezing.    Cardiovascular: Negative.  Negative for palpitations.   Gastrointestinal: Negative.    Genitourinary: Negative.    Musculoskeletal: Negative for back pain, joint pain and neck pain.   Neurological: Positive for speech change. Negative for dizziness, tremors, focal weakness, seizures, weakness and headaches.   Endo/Heme/Allergies: Negative.    Psychiatric/Behavioral: Negative.    All other systems reviewed and are negative.       Physical Exam  Temp:  [36.5 °C (97.7 °F)-37 °C (98.6 °F)] 36.7 °C (98.1 °F)  Pulse:  [63-96] 63  Resp:  [16-18] 16  BP: (103-147)/(57-91) 120/68  SpO2:  [93 %-97 %] 96 %    Physical Exam  Constitutional:       General: He is not  in acute distress.     Appearance: He is not toxic-appearing.   HENT:      Head: Normocephalic.      Comments: Facial abrasion noted around bridge of nose and right side of face.  Eyes:      Extraocular Movements: Extraocular movements intact.      Conjunctiva/sclera: Conjunctivae normal.      Pupils: Pupils are equal, round, and reactive to light.   Neck:      Musculoskeletal: Normal range of motion and neck supple.   Cardiovascular:      Rate and Rhythm: Normal rate and regular rhythm.      Pulses: Normal pulses.      Heart sounds: Normal heart sounds.   Pulmonary:      Effort: Pulmonary effort is normal. No respiratory distress.      Breath sounds: Normal breath sounds. No wheezing.   Abdominal:      General: Abdomen is flat. Bowel sounds are normal. There is no distension.      Palpations: Abdomen is soft.      Tenderness: There is no abdominal tenderness.   Musculoskeletal:         General: Deformity present. No swelling or tenderness.      Comments: Right upper extremity weakness/contracture.   Skin:     General: Skin is dry.      Coloration: Skin is not jaundiced.      Findings: Bruising present.   Neurological:      Mental Status: He is alert.      Sensory: No sensory deficit.      Comments: Alert and oriented x1 to name, but not to place, year or event, dysarthric speech, right upper extremity weakness/contracture but otherwise no other focal deficit noted.   Psychiatric:         Mood and Affect: Mood normal.         Behavior: Behavior normal.         Fluids    Intake/Output Summary (Last 24 hours) at 8/1/2020 1018  Last data filed at 7/31/2020 2012  Gross per 24 hour   Intake --   Output 250 ml   Net -250 ml       Laboratory  Recent Labs     07/31/20  1000 08/01/20  0258   WBC 8.7 6.3   RBC 5.81 5.49   HEMOGLOBIN 16.2 15.1   HEMATOCRIT 49.2 46.8   MCV 84.7 85.2   MCH 27.9 27.5   MCHC 32.9* 32.3*   RDW 42.5 43.0   PLATELETCT 194 171   MPV 9.7 9.7     Recent Labs     07/31/20  1000 08/01/20  0258   SODIUM  134* 137   POTASSIUM 4.1 3.8   CHLORIDE 98 103   CO2 19* 21   GLUCOSE 142* 103*   BUN 21 15   CREATININE 0.76 0.58   CALCIUM 10.4 9.3     Recent Labs     07/31/20  1000   APTT 28.6   INR 1.11         Recent Labs     08/01/20  0258   TRIGLYCERIDE 85   HDL 35*   LDL 85       Imaging  DX-CHEST-PORTABLE (1 VIEW)   Final Result      No acute cardiopulmonary abnormality.      CT-CTA NECK WITH & W/O-POST PROCESSING   Final Result      1.  Scattered atherosclerotic plaque within the bilateral common and internal carotid arteries with less than 50% stenosis. Probable small ulcerated plaque at the left carotid bifurcation.   2.  Mild narrowing within the right subclavian artery and at the left vertebral artery origin.      CT-CTA HEAD WITH & W/O-POST PROCESS   Final Result      1.  Occlusion at the left MCA origin which may be chronic due to chronic infarcts of the left temporal lobe and basal ganglia.   2.  There is flow in more distal left MCA branches likely due to pial collaterals.   3.  15 mm enhancing lesion in the floor of the right middle cranial fossa is likely extra-axial and probably a small meningioma. Brain MRI may be performed for further evaluation.      CT-HEAD W/O   Final Result      1.  Chronic ischemic changes.   2.  No acute intracranial abnormality.   3.  Left maxillary sinus disease.      EC-ECHOCARDIOGRAM COMPLETE W/O CONT    (Results Pending)   MR-BRAIN-W/O    (Results Pending)   [id number] VIEW    (Results Pending)        Assessment/Plan  Rhabdomyolysis  Assessment & Plan  On IV fluid  Trend CPK.  Monitor creatinine closely    Encephalopathy acute- (present on admission)  Assessment & Plan  Improving.  Not sure what his baseline mentation is  Check urine drug screen  Avoid narcotics and benzos  Monitor closely for possible underlying infection/UA/chest x-ray negative.      Aphasia- (present on admission)  Assessment & Plan  History of CVA several years ago, patient denies, with right upper extremity  weakness/contractures.  Not a TPA candidate  CT scan of the head no acute finding  MRI of the brain is pending  Echocardiogram pending  Started on aspirin, statin  PT/OT-will likely need placement as patient is homeless.  Speech therapy.         VTE prophylaxis: Lovenox

## 2020-08-01 NOTE — PROGRESS NOTES
Monitor Summary: SR 78 - 96 NC -.16, QRS -.08, QT -.38, with rare PVC per strip from the monitor room.

## 2020-08-02 ENCOUNTER — APPOINTMENT (OUTPATIENT)
Dept: CARDIOLOGY | Facility: MEDICAL CENTER | Age: 70
End: 2020-08-02
Attending: STUDENT IN AN ORGANIZED HEALTH CARE EDUCATION/TRAINING PROGRAM
Payer: MEDICAID

## 2020-08-02 PROBLEM — Z59.00 HOMELESS: Status: ACTIVE | Noted: 2020-08-02

## 2020-08-02 PROBLEM — G93.40 ENCEPHALOPATHY ACUTE: Status: RESOLVED | Noted: 2020-07-31 | Resolved: 2020-08-02

## 2020-08-02 LAB
ALBUMIN SERPL BCP-MCNC: 3.5 G/DL (ref 3.2–4.9)
ALBUMIN/GLOB SERPL: 1.5 G/DL
ALP SERPL-CCNC: 64 U/L (ref 30–99)
ALT SERPL-CCNC: 13 U/L (ref 2–50)
ANION GAP SERPL CALC-SCNC: 9 MMOL/L (ref 7–16)
AST SERPL-CCNC: 16 U/L (ref 12–45)
BILIRUB SERPL-MCNC: 0.7 MG/DL (ref 0.1–1.5)
BUN SERPL-MCNC: 10 MG/DL (ref 8–22)
CALCIUM SERPL-MCNC: 8.8 MG/DL (ref 8.5–10.5)
CHLORIDE SERPL-SCNC: 107 MMOL/L (ref 96–112)
CK SERPL-CCNC: 108 U/L (ref 0–154)
CO2 SERPL-SCNC: 25 MMOL/L (ref 20–33)
CREAT SERPL-MCNC: 0.66 MG/DL (ref 0.5–1.4)
GLOBULIN SER CALC-MCNC: 2.3 G/DL (ref 1.9–3.5)
GLUCOSE SERPL-MCNC: 110 MG/DL (ref 65–99)
MAGNESIUM SERPL-MCNC: 1.9 MG/DL (ref 1.5–2.5)
PHOSPHATE SERPL-MCNC: 2.8 MG/DL (ref 2.5–4.5)
POTASSIUM SERPL-SCNC: 3.8 MMOL/L (ref 3.6–5.5)
PROT SERPL-MCNC: 5.8 G/DL (ref 6–8.2)
SODIUM SERPL-SCNC: 141 MMOL/L (ref 135–145)

## 2020-08-02 PROCEDURE — 83735 ASSAY OF MAGNESIUM: CPT

## 2020-08-02 PROCEDURE — 82550 ASSAY OF CK (CPK): CPT

## 2020-08-02 PROCEDURE — 84100 ASSAY OF PHOSPHORUS: CPT

## 2020-08-02 PROCEDURE — 700102 HCHG RX REV CODE 250 W/ 637 OVERRIDE(OP): Performed by: INTERNAL MEDICINE

## 2020-08-02 PROCEDURE — 700105 HCHG RX REV CODE 258: Performed by: INTERNAL MEDICINE

## 2020-08-02 PROCEDURE — 36415 COLL VENOUS BLD VENIPUNCTURE: CPT

## 2020-08-02 PROCEDURE — 80053 COMPREHEN METABOLIC PANEL: CPT

## 2020-08-02 PROCEDURE — A9270 NON-COVERED ITEM OR SERVICE: HCPCS | Performed by: INTERNAL MEDICINE

## 2020-08-02 PROCEDURE — G0378 HOSPITAL OBSERVATION PER HR: HCPCS

## 2020-08-02 PROCEDURE — 700111 HCHG RX REV CODE 636 W/ 250 OVERRIDE (IP): Performed by: INTERNAL MEDICINE

## 2020-08-02 PROCEDURE — 99225 PR SUBSEQUENT OBSERVATION CARE,LEVEL II: CPT | Performed by: STUDENT IN AN ORGANIZED HEALTH CARE EDUCATION/TRAINING PROGRAM

## 2020-08-02 RX ADMIN — SODIUM CHLORIDE: 9 INJECTION, SOLUTION INTRAVENOUS at 01:18

## 2020-08-02 RX ADMIN — ENOXAPARIN SODIUM 40 MG: 40 INJECTION SUBCUTANEOUS at 04:04

## 2020-08-02 RX ADMIN — ATORVASTATIN CALCIUM 80 MG: 80 TABLET, FILM COATED ORAL at 16:43

## 2020-08-02 RX ADMIN — ASPIRIN 325 MG: 325 TABLET, FILM COATED ORAL at 04:04

## 2020-08-02 ASSESSMENT — ENCOUNTER SYMPTOMS
PALPITATIONS: 0
HEADACHES: 0
COUGH: 0
CARDIOVASCULAR NEGATIVE: 1
WEAKNESS: 0
RESPIRATORY NEGATIVE: 1
FOCAL WEAKNESS: 0
TREMORS: 0
GASTROINTESTINAL NEGATIVE: 1
DIZZINESS: 0
SPEECH CHANGE: 1
WHEEZING: 0
NECK PAIN: 0
BACK PAIN: 0
SEIZURES: 0
SHORTNESS OF BREATH: 0
EYES NEGATIVE: 1
CONSTITUTIONAL NEGATIVE: 1
PSYCHIATRIC NEGATIVE: 1

## 2020-08-02 NOTE — CARE PLAN
Problem: Communication  Goal: The ability to communicate needs accurately and effectively will improve  Outcome: PROGRESSING AS EXPECTED  Note: Educated pt on stroke POC, medications, and answered any questions the pt had. Reinforced the use of the call light. Encouraged pt to voice any concerns or needs. Will continue to monitor.        Problem: Safety  Goal: Will remain free from injury  Outcome: PROGRESSING AS EXPECTED  Note: Educated pt on stroke POC and fall risk. Assessed pts understanding of using call light for assistance. Education for stroke provided. Fall precautions in place. Call light within reach, appropriate signage placed, treaded socks and bed alarm on.

## 2020-08-02 NOTE — PROGRESS NOTES
[facility] Medicine Daily Progress Note    Date of Service  8/2/2020    Chief Complaint  70 y.o. male admitted 7/31/2020 with aphasia.    Hospital Course  70 y.o. male who presented 7/31/2020 with aphasia.  The patient is a very poor historian so most of the history was taken from ERP and chart review.  The patient has another chart with MRN number [id number].  Apparently he has history of CVA and was recently in the hospital. The patient is usually wheelchair-bound and with chronic right upper extremity weakness. He was found down in the ditch and he was sent here to ER for further work-up.  In the ER CT scan of the head was done with no acute finding.  He will be admitted for further work-up.    Interval Problem Update  No acute events overnight.   MRI with findings of old infarcts.  TTE showed a normal EF but with a possible mass lesion extending to the LVOT.   SHILPA pending.       Consultants/Specialty  None    Code Status  Full    Disposition  Will need placement as patient is homeless.    Review of Systems  Review of Systems   Constitutional: Negative.    HENT: Negative.    Eyes: Negative.    Respiratory: Negative.  Negative for cough, shortness of breath and wheezing.    Cardiovascular: Negative.  Negative for palpitations.   Gastrointestinal: Negative.    Genitourinary: Negative.    Musculoskeletal: Negative for back pain, joint pain and neck pain.   Neurological: Positive for speech change. Negative for dizziness, tremors, focal weakness, seizures, weakness and headaches.   Endo/Heme/Allergies: Negative.    Psychiatric/Behavioral: Negative.    All other systems reviewed and are negative.       Physical Exam  Temp:  [36.3 °C (97.4 °F)-36.8 °C (98.3 °F)] 36.3 °C (97.4 °F)  Pulse:  [59-75] 66  Resp:  [15-16] 16  BP: (105-143)/(65-78) 115/78  SpO2:  [96 %-99 %] 97 %    Physical Exam  Constitutional:       General: He is not in acute distress.     Appearance: He is not toxic-appearing.   HENT:      Head: Normocephalic.       Comments: Facial abrasion noted around bridge of nose and right side of face.  Eyes:      Extraocular Movements: Extraocular movements intact.      Conjunctiva/sclera: Conjunctivae normal.      Pupils: Pupils are equal, round, and reactive to light.   Neck:      Musculoskeletal: Normal range of motion and neck supple.   Cardiovascular:      Rate and Rhythm: Normal rate and regular rhythm.      Pulses: Normal pulses.      Heart sounds: Normal heart sounds.   Pulmonary:      Effort: Pulmonary effort is normal. No respiratory distress.      Breath sounds: Normal breath sounds. No wheezing.   Abdominal:      General: Abdomen is flat. Bowel sounds are normal. There is no distension.      Palpations: Abdomen is soft.      Tenderness: There is no abdominal tenderness.   Musculoskeletal:         General: Deformity present. No swelling or tenderness.      Comments: Right upper extremity weakness/contracture.   Skin:     General: Skin is dry.      Coloration: Skin is not jaundiced.      Findings: Bruising present.   Neurological:      Mental Status: He is alert.      Sensory: No sensory deficit.      Comments: Alert and oriented x 2-3, dysarthric speech, right upper extremity weakness/contracture but otherwise no other focal deficit noted.   Psychiatric:         Mood and Affect: Mood normal.         Behavior: Behavior normal.         Fluids    Intake/Output Summary (Last 24 hours) at 8/2/2020 0831  Last data filed at 8/2/2020 0400  Gross per 24 hour   Intake 480 ml   Output 1050 ml   Net -570 ml       Laboratory  Recent Labs     07/31/20  1000 08/01/20  0258   WBC 8.7 6.3   RBC 5.81 5.49   HEMOGLOBIN 16.2 15.1   HEMATOCRIT 49.2 46.8   MCV 84.7 85.2   MCH 27.9 27.5   MCHC 32.9* 32.3*   RDW 42.5 43.0   PLATELETCT 194 171   MPV 9.7 9.7     Recent Labs     07/31/20  1000 08/01/20  0258 08/02/20  0521   SODIUM 134* 137 141   POTASSIUM 4.1 3.8 3.8   CHLORIDE 98 103 107   CO2 19* 21 25   GLUCOSE 142* 103* 110*   BUN 21 15 10    CREATININE 0.76 0.58 0.66   CALCIUM 10.4 9.3 8.8     Recent Labs     07/31/20  1000   APTT 28.6   INR 1.11         Recent Labs     08/01/20  0258   TRIGLYCERIDE 85   HDL 35*   LDL 85       Imaging  MR-BRAIN-W/O   Final Result      1.  No evidence of acute territorial infarct, intracranial hemorrhage or mass lesion.   2.  Chronic left middle cerebral artery territory infarct.   3.  Slow flow or occlusion of the left middle cerebral artery M1 and M2 segments.   4.  Mild diffuse cerebral substance loss.   5.  Mild microangiopathic ischemic change versus demyelination or gliosis.   6.  Left maxillary sinusitis.      EC-ECHOCARDIOGRAM COMPLETE W/O CONT   Final Result      [id number] VIEW   Final Result      No metallic density identified in the abdomen or pelvis.      DX-CHEST-PORTABLE (1 VIEW)   Final Result      No acute cardiopulmonary abnormality.      CT-CTA NECK WITH & W/O-POST PROCESSING   Final Result      1.  Scattered atherosclerotic plaque within the bilateral common and internal carotid arteries with less than 50% stenosis. Probable small ulcerated plaque at the left carotid bifurcation.   2.  Mild narrowing within the right subclavian artery and at the left vertebral artery origin.      CT-CTA HEAD WITH & W/O-POST PROCESS   Final Result      1.  Occlusion at the left MCA origin which may be chronic due to chronic infarcts of the left temporal lobe and basal ganglia.   2.  There is flow in more distal left MCA branches likely due to pial collaterals.   3.  15 mm enhancing lesion in the floor of the right middle cranial fossa is likely extra-axial and probably a small meningioma. Brain MRI may be performed for further evaluation.      CT-HEAD W/O   Final Result      1.  Chronic ischemic changes.   2.  No acute intracranial abnormality.   3.  Left maxillary sinus disease.           Assessment/Plan  Homeless  Assessment & Plan  Patient brought in after being found in a ditch s/p fall.  States she has been  living in homeless shelter the last few months.  PT/OT recommends long-term placement  SW consulted to help with placement.    Rhabdomyolysis  Assessment & Plan  Improving.  IV fluids stopped.  Monitor creatinine closely    Aphasia- (present on admission)  Assessment & Plan  Speech is improving.    History of CVA several years ago, patient denies, with right upper extremity weakness/contractures.  Not a TPA candidate  CT scan of the head no acute finding  MRI of the brain with findings of old infarcts.  Echocardiogram showed normal ejection fraction, but with possible mass lesion extending into the LVOT.  Cardiology will be consulted and patient may need a SHILPA.  Continue aspirin, statin  PT/OT-will likely need placement as patient is homeless.         VTE prophylaxis: Lovenox

## 2020-08-02 NOTE — PROGRESS NOTES
Monitor summary: SB/SR 56-79, WA 0.18, QRS 0.08, QT 0.40, with occasional PVCs pause per strip from monitor room.

## 2020-08-02 NOTE — CARE PLAN
Problem: Communication:  Goal: The ability to communicate needs accurately and effectively will improve  Outcome: PROGRESSING AS EXPECTED  Note: POC discussed with patient. Patient will be NPO at midnight for a possible SHILPA tomorrow. Patient continues to have right sided weakness and expressive aphasia.     Problem: Self-Care:  Goal: Ability to participate in self-care as condition permits will improve  Outcome: PROGRESSING AS EXPECTED

## 2020-08-02 NOTE — PROGRESS NOTES
Monitor Summary: SB-SR 50-76, WI .20, QRS .06, QT .36 with bigem,couplet & frequent PVC per strip from monitor room

## 2020-08-02 NOTE — ASSESSMENT & PLAN NOTE
Patient brought in after being found in a ditch s/p fall.  States she has been living in homeless shelter the last few months.  PT/OT recommends long-term placement  SW consulted to help with placement.

## 2020-08-03 ENCOUNTER — APPOINTMENT (OUTPATIENT)
Dept: CARDIOLOGY | Facility: MEDICAL CENTER | Age: 70
End: 2020-08-03
Attending: STUDENT IN AN ORGANIZED HEALTH CARE EDUCATION/TRAINING PROGRAM
Payer: MEDICAID

## 2020-08-03 ENCOUNTER — ANESTHESIA (OUTPATIENT)
Dept: CARDIOLOGY | Facility: MEDICAL CENTER | Age: 70
End: 2020-08-03
Payer: MEDICAID

## 2020-08-03 ENCOUNTER — ANESTHESIA EVENT (OUTPATIENT)
Dept: CARDIOLOGY | Facility: MEDICAL CENTER | Age: 70
End: 2020-08-03
Payer: MEDICAID

## 2020-08-03 LAB
APTT PPP: 30.3 SEC (ref 24.7–36)
INR PPP: 1.08 (ref 0.87–1.13)
LV EJECT FRACT  99904: 60
PROTHROMBIN TIME: 14.4 SEC (ref 12–14.6)

## 2020-08-03 PROCEDURE — 85610 PROTHROMBIN TIME: CPT

## 2020-08-03 PROCEDURE — 93312 ECHO TRANSESOPHAGEAL: CPT

## 2020-08-03 PROCEDURE — 700105 HCHG RX REV CODE 258: Performed by: ANESTHESIOLOGY

## 2020-08-03 PROCEDURE — A9270 NON-COVERED ITEM OR SERVICE: HCPCS | Performed by: INTERNAL MEDICINE

## 2020-08-03 PROCEDURE — 700102 HCHG RX REV CODE 250 W/ 637 OVERRIDE(OP): Performed by: INTERNAL MEDICINE

## 2020-08-03 PROCEDURE — 700111 HCHG RX REV CODE 636 W/ 250 OVERRIDE (IP): Performed by: ANESTHESIOLOGY

## 2020-08-03 PROCEDURE — 160002 HCHG RECOVERY MINUTES (STAT)

## 2020-08-03 PROCEDURE — 36415 COLL VENOUS BLD VENIPUNCTURE: CPT

## 2020-08-03 PROCEDURE — 93312 ECHO TRANSESOPHAGEAL: CPT | Mod: 26 | Performed by: INTERNAL MEDICINE

## 2020-08-03 PROCEDURE — 99225 PR SUBSEQUENT OBSERVATION CARE,LEVEL II: CPT | Performed by: STUDENT IN AN ORGANIZED HEALTH CARE EDUCATION/TRAINING PROGRAM

## 2020-08-03 PROCEDURE — 93325 DOPPLER ECHO COLOR FLOW MAPG: CPT | Mod: 26 | Performed by: INTERNAL MEDICINE

## 2020-08-03 PROCEDURE — 85730 THROMBOPLASTIN TIME PARTIAL: CPT

## 2020-08-03 PROCEDURE — G0378 HOSPITAL OBSERVATION PER HR: HCPCS

## 2020-08-03 RX ORDER — SODIUM CHLORIDE 9 MG/ML
INJECTION, SOLUTION INTRAVENOUS
Status: DISCONTINUED | OUTPATIENT
Start: 2020-08-03 | End: 2020-08-03 | Stop reason: SURG

## 2020-08-03 RX ORDER — SODIUM CHLORIDE, SODIUM LACTATE, POTASSIUM CHLORIDE, CALCIUM CHLORIDE 600; 310; 30; 20 MG/100ML; MG/100ML; MG/100ML; MG/100ML
INJECTION, SOLUTION INTRAVENOUS CONTINUOUS
Status: CANCELLED | OUTPATIENT
Start: 2020-08-03

## 2020-08-03 RX ORDER — ONDANSETRON 2 MG/ML
4 INJECTION INTRAMUSCULAR; INTRAVENOUS
Status: CANCELLED | OUTPATIENT
Start: 2020-08-03

## 2020-08-03 RX ORDER — HYDRALAZINE HYDROCHLORIDE 20 MG/ML
5 INJECTION INTRAMUSCULAR; INTRAVENOUS
Status: CANCELLED | OUTPATIENT
Start: 2020-08-03

## 2020-08-03 RX ORDER — LABETALOL HYDROCHLORIDE 5 MG/ML
5 INJECTION, SOLUTION INTRAVENOUS
Status: CANCELLED | OUTPATIENT
Start: 2020-08-03

## 2020-08-03 RX ORDER — METOPROLOL TARTRATE 1 MG/ML
1 INJECTION, SOLUTION INTRAVENOUS
Status: CANCELLED | OUTPATIENT
Start: 2020-08-03

## 2020-08-03 RX ADMIN — PROPOFOL 50 MG: 10 INJECTION, EMULSION INTRAVENOUS at 13:38

## 2020-08-03 RX ADMIN — PROPOFOL 50 MG: 10 INJECTION, EMULSION INTRAVENOUS at 13:47

## 2020-08-03 RX ADMIN — DOCUSATE SODIUM 50 MG AND SENNOSIDES 8.6 MG 2 TABLET: 8.6; 5 TABLET, FILM COATED ORAL at 05:33

## 2020-08-03 RX ADMIN — SODIUM CHLORIDE: 9 INJECTION, SOLUTION INTRAVENOUS at 13:36

## 2020-08-03 ASSESSMENT — ENCOUNTER SYMPTOMS
PSYCHIATRIC NEGATIVE: 1
SPEECH CHANGE: 1
GASTROINTESTINAL NEGATIVE: 1
EYES NEGATIVE: 1
RESPIRATORY NEGATIVE: 1
NECK PAIN: 0
CARDIOVASCULAR NEGATIVE: 1
PALPITATIONS: 0
SEIZURES: 0
TREMORS: 0
DIZZINESS: 0
COUGH: 0
BACK PAIN: 0
WEAKNESS: 0
HEADACHES: 0
FOCAL WEAKNESS: 0
WHEEZING: 0
CONSTITUTIONAL NEGATIVE: 1
SHORTNESS OF BREATH: 0

## 2020-08-03 ASSESSMENT — PAIN SCALES - GENERAL: PAIN_LEVEL: 0

## 2020-08-03 NOTE — CARE PLAN
Problem: Nutritional:  Goal: Achieve adequate nutritional intake  Description: Patient will resume PO diet and consume >50% of meals  Outcome: NOT MET

## 2020-08-03 NOTE — ANESTHESIA PREPROCEDURE EVALUATION
Relevant Problems   No relevant active problems       Physical Exam    Airway   Mallampati: II  TM distance: >3 FB  Neck ROM: full       Cardiovascular - normal exam  Rhythm: regular  Rate: normal  (-) murmur     Dental - normal exam           Pulmonary - normal exam  Breath sounds clear to auscultation     Abdominal    Neurological - normal exam                 Anesthesia Plan    ASA 3   ASA physical status 3 criteria: CVA or TIA - history (> 3 months)    Plan - general       Airway plan will be natural airway        Induction: intravenous    Postoperative Plan: Postoperative administration of opioids is intended.    Pertinent diagnostic labs and testing reviewed    Informed Consent:    Anesthetic plan and risks discussed with patient.    Use of blood products discussed with: patient whom consented to blood products.

## 2020-08-03 NOTE — PROGRESS NOTES
Monitor Summary  Sinus Rhythm/Sinus Jefry  49-86  Rare PVC's, Rare PAC's  .20/.08/.40  Per strip from monitor room

## 2020-08-03 NOTE — THERAPY
Missed Therapy     Patient Name: Chuy Luis  Age:  70 y.o., Sex:  male  Medical Record #: [id number]  Today's Date: 8/3/2020    Discussed missed therapy with RN       08/03/20 0596   Interdisciplinary Plan of Care Collaboration   IDT Collaboration with  Nursing   Collaboration Comments This SLP attempted to see patient for aphasia evaluation. Patient off floor for SHILPA today. SLP will hold evaluation and re-attempt later as able and appropriate. Thank you.

## 2020-08-03 NOTE — ANESTHESIA TIME REPORT
Anesthesia Start and Stop Event Times     Date Time Event    8/3/2020 1336 Ready for Procedure     1336 Anesthesia Start     1422 Anesthesia Stop        Responsible Staff  08/03/20    Name Role Begin End    Willem Andersen M.D. Anesth 1336 1422        Preop Diagnosis (Free Text):  Pre-op Diagnosis             Preop Diagnosis (Codes):    Post op Diagnosis  Slurred speech      Premium Reason  Non-Premium    Comments:

## 2020-08-03 NOTE — DISCHARGE PLANNING
Anticipated Discharge Disposition:   TBD    Action:    Pt admitted with rhabdomyolysis, aphasia, hx cva. Pt disoriented per epic notes.    RN DIRK requested [facility] search.    RN DIRK made a call to Sid Luis [phone] and requested a return call.      He returned call and confirmed that patient is his brother.  Sid lives in [place].  He stated he will be able to provide medical decision making and will pursue guardianship if needed.  In Nov 2019 he was having trouble communicating with his brother and then contacted patient's  and was informed that patient's things were removed.  He requested a welfare check in April 2020 and patient's whereabouts unknown.      [employer] text to Dr. Martins with request to please telephone patient's brother with updates.    Barriers to Discharge:    Medical clearance  Pt disoriented  Homeless    Plan:    F/U with patient's brother.

## 2020-08-03 NOTE — OR NURSING
1413 Pt arrived to PPU with Anesthesiologist and Echo RN. AAOx3. VSS. Denies pain and nausea. POC update given.    1505 Criteria met. Report called to DARCIE Dueñas on Neuro. Pt will be transported back to [facility].    1510 Pt transported to room with PPU RN, monitor, chart, and all belongings with pt.
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
## 2020-08-03 NOTE — CONSULTS
BRIEF PSYCHIATRIC CONSULT NOTE: not seen. Please clarify what kind of capacity. Is he refusing meds? Refusing placement? Per notes, a placement is being sought. consult deferred until clarified.

## 2020-08-03 NOTE — THERAPY
Missed Therapy     Patient Name: Chuy Luis  Age:  70 y.o., Sex:  male  Medical Record #: [id number]  Today's Date: 8/3/2020           08/03/20 1408   Interdisciplinary Plan of Care Collaboration   IDT Collaboration with  Nursing   Collaboration Comments Pt off the floor for SHILPA, not available for PT tx today.

## 2020-08-03 NOTE — ANESTHESIA QCDR
2019 [facility] Clinical Data Registry (for Quality Improvement)     Postoperative nausea/vomiting risk protocol (Adult = 18 yrs and Pediatric 3-17 yrs)- (430 and 463)  General inhalation anesthetic (NOT TIVA) with PONV risk factors: No  Provision of anti-emetic therapy with at least 2 different classes of agents: N/A  Patient DID NOT receive anti-emetic therapy and reason is documented in Medical Record: N/A    Multimodal Pain Management- (477)  Non-emergent surgery AND patient age >= 18: Yes  Use of Multimodal Pain Management, two or more drugs and/or interventions, NOT including systemic opioids: No  Exception: Documented allergy to multiple classes of analgesics: No       Smoking Abstinence (404)  Patient is current smoker (cigarette, pipe, e-cig, marijuanna): No  Elective Surgery:   Abstinence instructions provided prior to day of surgery:   Patient abstained from smoking on day of surgery:     Pre-Op Beta-Blocker in Isolated CABG (44)  Isolated CABG AND patient age >= 18: No  Beta-blocker admin within 24 hours of surgical incision:   Exception:of medical reason(s) for not administering beta blocker within 24 hours prior to surgical incision (e.g., not  indicated,other medical reason):     PACU assessment of acute postoperative pain prior to Anesthesia Care End- Applies to Patients Age = 18- (ABG7)  Initial PACU pain score is which of the following: < 7/10  Patient unable to report pain score: N/A    Post-anesthetic transfer of care checklist/protocol to PACU/ICU- (426 and 427)  Upon conclusion of case, patient transferred to which of the following locations: PACU/Non-ICU  Use of transfer checklist/protocol: Yes  Exclusion: Service Performed in Patient Hospital Room (and thus did not require transfer): N/A  Unplanned admission to ICU related to anesthesia service up through end of PACU care- (MD51)  Unplanned admission to ICU (not initially anticipated at anesthesia start time): No

## 2020-08-03 NOTE — DIETARY
"Nutrition Services: Day 0 of admit.  Chuy Luis is a 70 y.o. male with admitting DX of Slurred speech  Consult received for Unsure Wt Loss >1 Year on Nutrition Admit Screen (MST 2)    Assessment:  Ht: 193 cm, Wt 8/1: 79.9 kg via bed scale, BMI: 21.45 - Normal  Diet/Intake: NPO since midnight. Pt was previously on regular, soft & bite sized diet with thin liquids and 1:1 supervision and per chart pt PO was %     Evaluation:   1. Current problem list: aphasia, rhabdomyolysis, homeless  2. Attempted to visit pt, pt was sleeping.   3. Per MD note 8/3 \"patient is a very poor historian\" and SHILPA is pending.   4. Per chart review pt's wt on 7/28 was 78 kg via stand up scale. Wt has slightly trended up in 6 days.   5. Labs 8/2: Glucose 110  6. Meds: lovenox, pericolace    Malnutrition Risk: No criteria noted at this time.     Recommendations/Plan:  1. Resume PO diet when medically feasible.    2. Encourage intake of meals  3. Document intake of all meals as % taken in ADL's to provide interdisciplinary communication across all shifts.   4. Monitor weight.  5. Nutrition rep will continue to see patient for ongoing meal and snack preferences.  6. Obtain supplement order per RD as needed.    RD following  "

## 2020-08-03 NOTE — DISCHARGE PLANNING
W/U is negative for an acute event.  Chuy is homeless, therefore lacking D/C resources/support.  Anticipate LTC once medically cleared.  TCC will no longer follow.  Please reach out to myself @ [phone] with any questions.

## 2020-08-03 NOTE — CARE PLAN
Problem: Communication  Goal: The ability to communicate needs accurately and effectively will improve  Outcome: PROGRESSING AS EXPECTED     Problem: Safety  Goal: Will remain free from falls  Outcome: PROGRESSING AS EXPECTED     Problem: Knowledge Deficit  Goal: Knowledge of disease process/condition, treatment plan, diagnostic tests, and medications will improve  Outcome: PROGRESSING AS EXPECTED     Problem: Discharge Barriers/Planning  Goal: Patient's continuum of care needs will be met  Outcome: PROGRESSING AS EXPECTED     Problem: Knowledge Deficit:  Goal: Knowledge of disease process/condition, treatment plan, diagnostic tests, and medications will improve  Outcome: PROGRESSING AS EXPECTED

## 2020-08-03 NOTE — PROGRESS NOTES
Monitor summary: SB/SR 53-75, AL 0.16, QRS 0.08, QT 0.36, with rare PVCs  per strip from monitor room.

## 2020-08-03 NOTE — ANESTHESIA POSTPROCEDURE EVALUATION
Patient: Chuy Luis    Procedure Summary     Date:  08/03/20 Room / Location:  [facility] - ECHOCARDIOLOGY [facility]    Anesthesia Start:  1336 Anesthesia Stop:      Procedure:  EC-SHILPA W/O CONT Diagnosis:      Scheduled Providers:  Ana Willett M.D.; Willem Andersen M.D. Responsible Provider:  Willem Andersen M.D.    Anesthesia Type:  general ASA Status:  3          Final Anesthesia Type: general  Last vitals  BP   Blood Pressure : 146/83    Temp   36.6 °C (97.9 °F)    Pulse   Pulse: 64   Resp   16    SpO2   97 %      Anesthesia Post Evaluation    Patient location during evaluation: PACU  Patient participation: complete - patient participated  Level of consciousness: awake and alert  Pain score: 0    Airway patency: patent  Anesthetic complications: no  Cardiovascular status: hemodynamically stable  Respiratory status: acceptable  Hydration status: euvolemic    PONV: none           Nurse Pain Score: 0 (NPRS)

## 2020-08-04 PROBLEM — R73.03 PREDIABETES: Status: ACTIVE | Noted: 2020-08-04

## 2020-08-04 PROBLEM — Z86.73 HISTORY OF CVA (CEREBROVASCULAR ACCIDENT): Status: ACTIVE | Noted: 2020-07-31

## 2020-08-04 PROBLEM — R13.10 DYSPHAGIA: Status: ACTIVE | Noted: 2020-08-04

## 2020-08-04 LAB
ANION GAP SERPL CALC-SCNC: 11 MMOL/L (ref 7–16)
BUN SERPL-MCNC: 8 MG/DL (ref 8–22)
CALCIUM SERPL-MCNC: 9.1 MG/DL (ref 8.5–10.5)
CHLORIDE SERPL-SCNC: 99 MMOL/L (ref 96–112)
CO2 SERPL-SCNC: 27 MMOL/L (ref 20–33)
CREAT SERPL-MCNC: 0.63 MG/DL (ref 0.5–1.4)
GLUCOSE SERPL-MCNC: 122 MG/DL (ref 65–99)
POTASSIUM SERPL-SCNC: 3.7 MMOL/L (ref 3.6–5.5)
SODIUM SERPL-SCNC: 137 MMOL/L (ref 135–145)
TSH SERPL DL<=0.005 MIU/L-ACNC: 1.32 UIU/ML (ref 0.38–5.33)
VIT B12 SERPL-MCNC: 401 PG/ML (ref 211–911)

## 2020-08-04 PROCEDURE — 700111 HCHG RX REV CODE 636 W/ 250 OVERRIDE (IP): Performed by: INTERNAL MEDICINE

## 2020-08-04 PROCEDURE — 84443 ASSAY THYROID STIM HORMONE: CPT

## 2020-08-04 PROCEDURE — 80048 BASIC METABOLIC PNL TOTAL CA: CPT

## 2020-08-04 PROCEDURE — 99204 OFFICE O/P NEW MOD 45 MIN: CPT | Performed by: PSYCHIATRY & NEUROLOGY

## 2020-08-04 PROCEDURE — A9270 NON-COVERED ITEM OR SERVICE: HCPCS | Performed by: INTERNAL MEDICINE

## 2020-08-04 PROCEDURE — 700102 HCHG RX REV CODE 250 W/ 637 OVERRIDE(OP): Performed by: INTERNAL MEDICINE

## 2020-08-04 PROCEDURE — G0378 HOSPITAL OBSERVATION PER HR: HCPCS

## 2020-08-04 PROCEDURE — 36415 COLL VENOUS BLD VENIPUNCTURE: CPT

## 2020-08-04 PROCEDURE — 82607 VITAMIN B-12: CPT

## 2020-08-04 PROCEDURE — 99225 PR SUBSEQUENT OBSERVATION CARE,LEVEL II: CPT | Performed by: FAMILY MEDICINE

## 2020-08-04 ASSESSMENT — ENCOUNTER SYMPTOMS
FEVER: 0
PALPITATIONS: 0
SHORTNESS OF BREATH: 0
FOCAL WEAKNESS: 1
CONSTITUTIONAL NEGATIVE: 1
BLURRED VISION: 0
PSYCHIATRIC NEGATIVE: 1
DIZZINESS: 0
VOMITING: 0
SENSORY CHANGE: 0
DIARRHEA: 0
BACK PAIN: 0
HEARTBURN: 0
RESPIRATORY NEGATIVE: 1
EYES NEGATIVE: 1
SPEECH CHANGE: 1
MUSCULOSKELETAL NEGATIVE: 1
NERVOUS/ANXIOUS: 0
HEADACHES: 0
COUGH: 0
DIAPHORESIS: 0
MYALGIAS: 0
FLANK PAIN: 0
WHEEZING: 0
SORE THROAT: 0
GASTROINTESTINAL NEGATIVE: 1
NAUSEA: 0
NECK PAIN: 0
ABDOMINAL PAIN: 0
CHILLS: 0
WEAKNESS: 0
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
## 2020-08-04 NOTE — PROGRESS NOTES
Monitor Summary  Sinus Rhythm  69-75  Rare PVC's, ocasional PAC's  .18/.10/.40  Per strip from monitor room

## 2020-08-04 NOTE — CONSULTS
"PSYCHIATRIC INTAKE EVALUATION    *Reason for admission:    ED:   history of being in a wheelchair for unknown reason, the patient is a poor historian, the patient is brought in by EMS, he was found in a ditch with unknown downtime.  The patient has contracture of his right upper extremity, it is unclear whether this is new or old.  He has weakness of his right lower extremity, it is unclear whether this is new or old.  Further HPI cannot be obtained from the patient secondary to him being a poor historian.   There was also a note saying he left [facility] and at some point was living in a homeless shelter. Unable to verify.           *Reason for consult:    Capacity for medical decisions and placement    *Requesting Physician/APN:  STEVIE Martins DO           Legal Hold status:  NA          *Chief Complaint: expressive aphasia       *HPI (includes Psychiatric ROS): 69 yo male who has a hx of CVA affective his L temporal lobe and either part of the lower L frontal lobe or a conduction syndrome between his wernicke and broca's area given that he has expressive aphasia. He knows he is in the hospital and the year because he fell and that his speech is impaired because his tongue doesn't work right. He seems to indicate that he is aware he needs help and would be willing to be in a supervised living situation. He does not think he has any trouble with swallowing or choking.    He denies past psych hx and says he is not depressed, nor anxious but when asked about thoughts of death I could not get a clear picture if he has had any recently. He seems to say \"no\" eventually and his affect is one of euthymia but I cannot be certain. He also denies AVH or paranoia.     Asked about the nose scab he indicates he fell.      *Medical Review Of Symptoms (not dx conditions):   Review of Systems   Constitutional: Negative.    HENT: Negative.    Eyes: Negative.    Respiratory: Negative.    Gastrointestinal: Negative.    Genitourinary: " "Negative.    Musculoskeletal: Negative.    Skin: Negative.    Neurological:        Indicates something is wrong with his R arm   Psychiatric/Behavioral: Negative.        *Psychiatric Examination:   Vitals: Blood pressure 130/78, pulse 77, temperature 36.6 °C (97.9 °F), temperature source Temporal, resp. rate 16, height 1.93 m (6' 3.98\"), weight 79.9 kg (176 lb 2.4 oz), SpO2 93 %.  General Appearance: thin, scab on his nose, cooperative and good eye contact.  Abnormal Movements: R arm/hand are held in a flexed position  Gait and Posture:sitting up in kunal  Speech: many misused words, unable to articulate some words but overall themes can be picked up  Thought Process:normal rate   Associations: seemingly linear  Abnormal or Psychotic Thoughts:none noted  Judgement and Insight:suspect it is fair at best  Orientation: as noted  Recent and Remote Memory: grossly intact given the information replied to and how.   Attention Span and Concentration:intact  Language:non fluent  Fund of Knowledge: not tested  Mood and Affect:euthymic and appears to indicate he is saying \"happy\" and \"always happy\"  SI/HI: denies but not so sure about if he wishes to be dead at times.     *PAST MEDICAL/PSYCH/FAMILY/SOCIAL(as reported by patient):       *medical hx:          History reviewed. No pertinent past medical history.  History reviewed. No pertinent surgical history.     *psychiatric hx: he denies SAs, hospitalizations and doesn't want a medication for sadness or anxiety.     *family Psych hx:  Not assessed     *social hx: as noted by notes. Not clear on interview except at some point he lived on his own. Appears to have graduated HS and had some college. Was  at some point  Alcohol: denies  Drugs:denies     *MEDICAL HX: labs, MARS, medications, etc were reviewed. Only those findings of potential interest to psychiatry are noted below:    *Current Medical issues:   see below     *Allergies:  No Known Allergies  *Current " Medications:    Current Facility-Administered Medications:   •  senna-docusate (PERICOLACE or SENOKOT S) 8.6-50 MG per tablet 2 Tab, 2 Tab, Oral, BID, 2 Tab at 08/03/20 0533 **AND** polyethylene glycol/lytes (MIRALAX) PACKET 1 Packet, 1 Packet, Oral, QDAY PRN **AND** magnesium hydroxide (MILK OF MAGNESIA) suspension 30 mL, 30 mL, Oral, QDAY PRN **AND** bisacodyl (DULCOLAX) suppository 10 mg, 10 mg, Rectal, QDAY PRN, Rafi Parra M.D.  •  enoxaparin (LOVENOX) inj 40 mg, 40 mg, Subcutaneous, DAILY, Rafi Parra M.D., Stopped at 08/03/20 0800  •  acetaminophen (TYLENOL) tablet 650 mg, 650 mg, Oral, Q6HRS PRN, Rafi Parra M.D.  •  ondansetron (ZOFRAN) syringe/vial injection 4 mg, 4 mg, Intravenous, Q4HRS PRN, Rafi Parra M.D.  •  ondansetron (ZOFRAN ODT) dispertab 4 mg, 4 mg, Oral, Q4HRS PRN, Rafi Parra M.D.  •  aspirin (ASA) tablet 325 mg, 325 mg, Oral, DAILY, Stopped at 08/03/20 0800 **OR** aspirin (ASA) chewable tab 324 mg, 324 mg, Oral, DAILY **OR** aspirin (ASA) suppository 300 mg, 300 mg, Rectal, DAILY, Rafi Parra M.D., 300 mg at 08/01/20 0446  •  atorvastatin (LIPITOR) tablet 80 mg, 80 mg, Oral, Q EVENING, Rafi Parra M.D., 80 mg at 08/02/20 1643  *ECG: personally reviewed QTc 460   *Imaging: personally reviewed MRI 2020: old L middle cerebral artery infarct: L temporal lobe and basal ganglia encephalomalacia and hyperintensity R cerebellar lobe        *Labs:  No results for input(s): WBC, RBC, HEMOGLOBIN, HEMATOCRIT, MCV, MCH, RDW, PLATELETCT, MPV, NEUTSPOLYS, LYMPHOCYTES, MONOCYTES, EOSINOPHILS, BASOPHILS, RBCMORPHOLO in the last 72 hours.  Lab Results   Component Value Date/Time    SODIUM 137 08/04/2020 07:40 AM    POTASSIUM 3.7 08/04/2020 07:40 AM    CHLORIDE 99 08/04/2020 07:40 AM    CO2 27 08/04/2020 07:40 AM    GLUCOSE 122 (H) 08/04/2020 07:40 AM    BUN 8 08/04/2020 07:40 AM    CREATININE 0.63 08/04/2020 07:40 AM         No results found for: BREATHALIZER  No components found for: BLOODALCOHOL   Lab  "Results   Component Value Date/Time    AMPHUR Negative 08/01/2020 1104    BARBSURINE Negative 08/01/2020 1104    BENZODIAZU Negative 08/01/2020 1104    COCAINEMET Negative 08/01/2020 1104    METHADONE Negative 08/01/2020 1104    OPIATES Negative 08/01/2020 1104    OXYCODN Negative 08/01/2020 1104    PCPURINE Negative 08/01/2020 1104    PROPOXY Negative 08/01/2020 1104    CANNABINOID Negative 08/01/2020 1104     No results found for: TSH, FREET4      *ASSESSMENT/PLAN:    1. 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  -found to be incapacitated on 7/25 but still dc'd. OT recommendations on this admit: Pt will required long term placement (group home vs snf), pt is not able to care for himself. Pt also needs an appropriate fitting WC    2. Medical:  -hx of L CVA, middle cerebral artery with aphasia, Broca's type with R sided deficits.   -dysphagia: per speech swallow 8/1: recommend strict adherence to safe swallow precautions with PO diet of Soft, bite-sized (SB6) and thin liquids (TN0) with meds per tolerance    Legal hold: NA. Pt seems to be able to answer some questions as if he has capacity. However behaviorally, he reportedly left [facility], and left here AMA on the prior admit. He cannot care well for himself on the street as noted by his need for a wheelchair and that he ended up in a ditch and was \"found\" per notes. My concern is that he would try to leave a placement AMA though he says he knows and wants supervision and help (because he has). In addition, and from a practical matter, if he needs help he will not be able to ask for it: no one is going to try to understand what he is saying after a few minutes of interaction so unless he is perceived to need help by what he looks like, he won't get any and he won't be able to call for help and explain himself. He cannot discuss or explain any of his issues to anyone at large. If he wants to go to a store to buy food " (despite his dysphagia) and wants someone to give him directions, he won't be able to explain that coherently and enough so he can get that help, and so on.    In addition and reviewing the speech consult from 7/25, the fact that he doesn't think that he has problems swallowing either, pt is  INCAPACITATED  to make medical and placement decisions.  However please engage him in picking out a preference from whatever options are available to him. He needs 24/7 supervision.    *Signing off  *Thank you for the consult     Speech cognitive eval 7/25  71 y/o admitted on 7/24 after inability to care for himself/left AMA at [facility]. Not seen by SLP before at [facility]. Dx chest was unremarkable. PMH includes R sided weakness and aphasia s/p stroke. Psychiatry evaluated pt and deemed that the pt is incompetent to leave AMA, but not on legal hold.   Pt seen on this date for an aphasia evaluation. According to EMR, language deficits were from previous stroke. Pt presented with severe expressive and mild-moderate receptive language deficits. Expressive language is characterized by use of paraphasias and neologisms and speech appears fluent as noted by phonemic jargon, semblance to english syntax and phonology. Speech is echolalic at times and pt does not appear to have full insight into expressive language deficits. He completed yes/no auditory comprehension tasks with 100% accuracy, followed sequential commends with 30% accuracy, repeated words/phrases with 75% accuracy, named objects in room with 75% accuracy, and followed apraxia tasks with 60% accuracy. He was unable to write his name or address. He identified pictures with 67% accuracy, shapes with 80% accuracy, letters with 100% accuracy, numbers with 83% accuracy, and colors with 100% accuracy. Reading comprehension task presented to pt and he completed with 56% accuracy and read simple 1-step directions with 83% accuracy. Given both expressive and receptive language  deficits, it is difficult to assess cognitive functioning as his ability to understand language appears to be impacted.   Plan   Unable to assess cognition alone given severe expressive and mild-moderate receptive language deficits. Would benefit from ongoing aphasia treatment during admit and at next level of care.   Recommend Speech Therapy 3 times per week until therapy goals are met for the following treatments: Comprehension Training, Expression Training and Patient / Family / Caregiver Education.   Discharge recommendations: Recommend inpatient transitional care services for continued speech therapy services.   Objective    07/27/20 1057   Vitals   O2 (LPM) 0   O2 Delivery Device None - Room Air   Prior Living Situation   Housing / Facility Other (Comments)   (homeless prior to admit)   Lives with - Patient's Self Care Capacity Alone and Unable to Care For Self   Prior Level Of Function   Communication Impaired   Swallow Unknown   Hearing Within Functional Limits for Evaluation   Vision Wears Corrective Lenses   Patient's Primary Language English   Occupation (Pre-Hospital Vocational) Not Employed   Verbal Expression   Verbal Expression / Aphasia Eval (WDL) X   Vocal Quality [place]   Verbal Output Automatic Severe (2)   Verbal Output: Phrases Severe (2)   Verbal Output Conversation Severe (2)   Verbal Output Functional Severe (2)   Naming Minimal (4)   (single words)   Jargon Severe (2)   Perseverations Minimal (4)   Paraphasias Moderate (3)   Auditory Comprehension   Auditory Comprehension (WDL) X   Yes / No Questions: General Information Within Functional Limits (6-7)   Identifies Objects Minimal (4)   Identifies Pictures Moderate (3)   Identifies Body Parts Within Functional Limits (6-7)   Follows One Unit Commands Minimal (4)   Follows Two Unit Commands Moderate (3)   Follows Three Unit Commands Severe (2)   Understands Paragraph Moderate (3)   Understands Complex Conversation Moderate (3)   Reading  Comprehension   Reading Comprehension (WDL) X   Verbally or Gesturally Identifies Letters Within Functional Limits (6-7)   Reading Phrases Moderate (3)   Reading Sentences Moderate (3)   Following Written Direction Minimal (4)   Written Expression   Written Expression (WDL) X   Functional Writing: Name Severe (2)   Formulates: Sentence Severe (2)   (asked to write address)   Overall Legibility Severe (2)   Cognitive-Linguistic   Level of Consciousness Alert   Orientation Level (difficult to assess given expressive/receptive language defi)   Short Term Goals   Short Term Goal # 1 Pt will write name and address independently given mod assistance from SLP   Short Term Goal # 2 Pt will follow 2-3 step directions with >75% accurracy and mod clinician cueing

## 2020-08-04 NOTE — CARE PLAN
Problem: Safety  Goal: Will remain free from falls  Outcome: PROGRESSING AS EXPECTED     Problem: Knowledge Deficit  Goal: Knowledge of disease process/condition, treatment plan, diagnostic tests, and medications will improve  Outcome: PROGRESSING AS EXPECTED     Problem: Knowledge Deficit:  Goal: Knowledge of disease process/condition, treatment plan, diagnostic tests, and medications will improve  Outcome: PROGRESSING AS EXPECTED

## 2020-08-04 NOTE — PROGRESS NOTES
Monitor summary: SR 64-76, TN 0.20, QRS 0.08, QT 0.42, with occasional PVCs per strip from monitor room.

## 2020-08-04 NOTE — PROGRESS NOTES
[facility] Medicine Daily Progress Note    Date of Service  8/4/2020    Chief Complaint  70 y.o. male admitted 7/31/2020 with Aphasia.    Hospital Course  Admitted with expressive aphasia, noted to have history of CVA.    Interval Problem Update  CVA - MRI showed no acute infarcts, needs cognitive evaluation for his expressive aphasia  Hyperglycemia - hgba1c 6.5    Consultants/Specialty  Psychiatry    Code Status  Full    Disposition  TBD    Review of Systems  Review of Systems   Constitutional: Negative for chills, diaphoresis, fever and malaise/fatigue.   HENT: Negative for congestion, hearing loss and sore throat.    Eyes: Negative for blurred vision.   Respiratory: Negative for cough, shortness of breath and wheezing.    Cardiovascular: Negative for chest pain, palpitations and leg swelling.   Gastrointestinal: Negative for abdominal pain, diarrhea, heartburn, nausea and vomiting.   Genitourinary: Negative for dysuria, flank pain and hematuria.   Musculoskeletal: Negative for back pain, joint pain, myalgias and neck pain.   Skin: Negative for rash.   Neurological: Positive for speech change and focal weakness. Negative for dizziness, sensory change, weakness and headaches.   Psychiatric/Behavioral: The patient is not nervous/anxious.         Physical Exam  Temp:  [36.3 °C (97.3 °F)-37.2 °C (98.9 °F)] 36.8 °C (98.2 °F)  Pulse:  [55-79] 79  Resp:  [14-16] 16  BP: (115-147)/(65-87) 135/75  SpO2:  [92 %-97 %] 93 %    Physical Exam  Vitals signs and nursing note reviewed.   HENT:      Head: Normocephalic and atraumatic.      Nose: No congestion.      Mouth/Throat:      Mouth: Mucous membranes are moist.   Eyes:      Extraocular Movements: Extraocular movements intact.      Conjunctiva/sclera: Conjunctivae normal.      Pupils: Pupils are equal, round, and reactive to light.   Neck:      Musculoskeletal: No muscular tenderness.   Cardiovascular:      Rate and Rhythm: Normal rate and regular rhythm.   Pulmonary:       Effort: Pulmonary effort is normal.      Breath sounds: Normal breath sounds.   Abdominal:      General: Bowel sounds are normal. There is no distension.      Palpations: Abdomen is soft.      Tenderness: There is no abdominal tenderness. There is no guarding or rebound.   Musculoskeletal:      Right lower leg: No edema.      Left lower leg: No edema.   Lymphadenopathy:      Cervical: No cervical adenopathy.   Skin:     General: Skin is warm and dry.   Neurological:      Mental Status: He is alert and oriented to person, place, and time.      Cranial Nerves: Dysarthria present.      Motor: Weakness (MMT RUE 0/5, RLE/LUE/LLE 5/5) present.         Fluids    Intake/Output Summary (Last 24 hours) at 8/4/2020 1310  Last data filed at 8/4/2020 1200  Gross per 24 hour   Intake 620 ml   Output 1000 ml   Net -380 ml       Laboratory      Recent Labs     08/02/20  0521 08/04/20  0740   SODIUM 141 137   POTASSIUM 3.8 3.7   CHLORIDE 107 99   CO2 25 27   GLUCOSE 110* 122*   BUN 10 8   CREATININE 0.66 0.63   CALCIUM 8.8 9.1     Recent Labs     08/03/20  0345   APTT 30.3   INR 1.08               Imaging  EC-SHILPA W/O CONT   Final Result      MR-BRAIN-W/O   Final Result      1.  No evidence of acute territorial infarct, intracranial hemorrhage or mass lesion.   2.  Chronic left middle cerebral artery territory infarct.   3.  Slow flow or occlusion of the left middle cerebral artery M1 and M2 segments.   4.  Mild diffuse cerebral substance loss.   5.  Mild microangiopathic ischemic change versus demyelination or gliosis.   6.  Left maxillary sinusitis.      EC-ECHOCARDIOGRAM COMPLETE W/O CONT   Final Result      [id number] VIEW   Final Result      No metallic density identified in the abdomen or pelvis.      DX-CHEST-PORTABLE (1 VIEW)   Final Result      No acute cardiopulmonary abnormality.      CT-CTA NECK WITH & W/O-POST PROCESSING   Final Result      1.  Scattered atherosclerotic plaque within the bilateral common and internal  carotid arteries with less than 50% stenosis. Probable small ulcerated plaque at the left carotid bifurcation.   2.  Mild narrowing within the right subclavian artery and at the left vertebral artery origin.      CT-CTA HEAD WITH & W/O-POST PROCESS   Final Result      1.  Occlusion at the left MCA origin which may be chronic due to chronic infarcts of the left temporal lobe and basal ganglia.   2.  There is flow in more distal left MCA branches likely due to pial collaterals.   3.  15 mm enhancing lesion in the floor of the right middle cranial fossa is likely extra-axial and probably a small meningioma. Brain MRI may be performed for further evaluation.      CT-HEAD W/O   Final Result      1.  Chronic ischemic changes.   2.  No acute intracranial abnormality.   3.  Left maxillary sinus disease.           Assessment/Plan  * History of CVA (cerebrovascular accident)- (present on admission)  Assessment & Plan  ASA, Lipitor  PT/OT/SLP   Check B12 and TSH  Needs cognitive evaluation      Dysphagia- (present on admission)  Assessment & Plan  Texture modifier level 6, liquid level 0  SLP to follow    Prediabetes- (present on admission)  Assessment & Plan  Change to diabetic diet  Will need metformin on discharge         VTE prophylaxis: Lovenox

## 2020-08-05 PROBLEM — Z91.199 NONCOMPLIANCE: Status: ACTIVE | Noted: 2020-08-05

## 2020-08-05 PROBLEM — R41.89 COGNITIVE IMPAIRMENT: Status: ACTIVE | Noted: 2020-08-05

## 2020-08-05 PROCEDURE — G0378 HOSPITAL OBSERVATION PER HR: HCPCS

## 2020-08-05 PROCEDURE — A9270 NON-COVERED ITEM OR SERVICE: HCPCS | Performed by: INTERNAL MEDICINE

## 2020-08-05 PROCEDURE — 700102 HCHG RX REV CODE 250 W/ 637 OVERRIDE(OP): Performed by: INTERNAL MEDICINE

## 2020-08-05 PROCEDURE — 99225 PR SUBSEQUENT OBSERVATION CARE,LEVEL II: CPT | Performed by: FAMILY MEDICINE

## 2020-08-05 RX ADMIN — DOCUSATE SODIUM 50 MG AND SENNOSIDES 8.6 MG 2 TABLET: 8.6; 5 TABLET, FILM COATED ORAL at 16:27

## 2020-08-05 RX ADMIN — ATORVASTATIN CALCIUM 80 MG: 80 TABLET, FILM COATED ORAL at 16:27

## 2020-08-05 ASSESSMENT — ENCOUNTER SYMPTOMS
NAUSEA: 0
PALPITATIONS: 0
CHILLS: 0
COUGH: 0
BLURRED VISION: 0
DIAPHORESIS: 0
SENSORY CHANGE: 0
WHEEZING: 0
ABDOMINAL PAIN: 0
HEADACHES: 0
NERVOUS/ANXIOUS: 0
VOMITING: 0
HEARTBURN: 0
SHORTNESS OF BREATH: 0
MYALGIAS: 0
DIARRHEA: 0
FLANK PAIN: 0
WEAKNESS: 0
SPEECH CHANGE: 1
DIZZINESS: 0
FEVER: 0
NECK PAIN: 0
FOCAL WEAKNESS: 1
SORE THROAT: 0
BACK PAIN: 0

## 2020-08-05 NOTE — PROGRESS NOTES
"Pt has been refusing medications, education given on the importance of the medication, pt still refuses and says \"I don't want them.\"  "

## 2020-08-05 NOTE — CARE PLAN
Problem: Communication  Goal: The ability to communicate needs accurately and effectively will improve  Outcome: PROGRESSING AS EXPECTED   Problem: Safety  Goal: Will remain free from falls  Outcome: PROGRESSING AS EXPECTED     Problem: Knowledge Deficit  Goal: Knowledge of disease process/condition, treatment plan, diagnostic tests, and medications will improve  Outcome: PROGRESSING AS EXPECTED

## 2020-08-05 NOTE — PROGRESS NOTES
[facility] Medicine Daily Progress Note    Date of Service  8/5/2020    Chief Complaint  70 y.o. male admitted 7/31/2020 with Aphasia.    Hospital Course  Admitted with expressive aphasia, with history of CVA with residual deficit of right upper extremity weakness and aphasia.  MRI showed no evidence of acute infarct.  Recent previous admission for same, where he was noncompliant and refused medications, interventions, and imaging.  Was previously seen by psychiatry and deemed to have incapacity to make medical decisions.  Seen by SLP for cognitive evaluation and assessment was Uuable to assess cognition alone given severe expressive and mild-moderate receptive language deficits.  Reevaluation done by psychiatry on this admission, patient with incapacity to make medical and placement decisions.    Interval Problem Update  CVA - residual deficit of right upper extremity weakness and expressive aphasia  Cognitive impairment - per Psychiatry pt is  INCAPACITATED  to make medical and placement decisions    Consultants/Specialty  Psychiatry    Code Status  Full    Disposition  Group Home? Guardianship?    Review of Systems  Review of Systems   Constitutional: Negative for chills, diaphoresis, fever and malaise/fatigue.   HENT: Negative for congestion, hearing loss and sore throat.    Eyes: Negative for blurred vision.   Respiratory: Negative for cough, shortness of breath and wheezing.    Cardiovascular: Negative for chest pain, palpitations and leg swelling.   Gastrointestinal: Negative for abdominal pain, diarrhea, heartburn, nausea and vomiting.   Genitourinary: Negative for dysuria, flank pain and hematuria.   Musculoskeletal: Negative for back pain, joint pain, myalgias and neck pain.   Skin: Negative for rash.   Neurological: Positive for speech change and focal weakness. Negative for dizziness, sensory change, weakness and headaches.   Psychiatric/Behavioral: The patient is not nervous/anxious.         Physical  Exam  Temp:  [35.9 °C (96.7 °F)-36.8 °C (98.2 °F)] 36.2 °C (97.1 °F)  Pulse:  [56-79] 68  Resp:  [16] 16  BP: (112-135)/(69-78) 112/69  SpO2:  [93 %-96 %] 96 %    Physical Exam  Vitals signs and nursing note reviewed.   HENT:      Head: Normocephalic and atraumatic.      Nose: No congestion.      Mouth/Throat:      Mouth: Mucous membranes are moist.   Eyes:      Extraocular Movements: Extraocular movements intact.      Conjunctiva/sclera: Conjunctivae normal.      Pupils: Pupils are equal, round, and reactive to light.   Neck:      Musculoskeletal: No muscular tenderness.   Cardiovascular:      Rate and Rhythm: Normal rate and regular rhythm.   Pulmonary:      Effort: Pulmonary effort is normal.      Breath sounds: Normal breath sounds.   Abdominal:      General: Bowel sounds are normal. There is no distension.      Palpations: Abdomen is soft.      Tenderness: There is no abdominal tenderness. There is no guarding or rebound.   Musculoskeletal:      Right lower leg: No edema.      Left lower leg: No edema.   Lymphadenopathy:      Cervical: No cervical adenopathy.   Skin:     General: Skin is warm and dry.   Neurological:      Mental Status: He is alert and oriented to person, place, and time.      Cranial Nerves: Dysarthria present.      Motor: Weakness (MMT RUE 0/5, RLE/LUE/LLE 5/5) present.         Fluids    Intake/Output Summary (Last 24 hours) at 8/5/2020 1134  Last data filed at 8/5/2020 0800  Gross per 24 hour   Intake 360 ml   Output 1200 ml   Net -840 ml       Laboratory      Recent Labs     08/04/20  0740   SODIUM 137   POTASSIUM 3.7   CHLORIDE 99   CO2 27   GLUCOSE 122*   BUN 8   CREATININE 0.63   CALCIUM 9.1     Recent Labs     08/03/20  0345   APTT 30.3   INR 1.08               Imaging  EC-SHILPA W/O CONT   Final Result      MR-BRAIN-W/O   Final Result      1.  No evidence of acute territorial infarct, intracranial hemorrhage or mass lesion.   2.  Chronic left middle cerebral artery territory infarct.   3.   Slow flow or occlusion of the left middle cerebral artery M1 and M2 segments.   4.  Mild diffuse cerebral substance loss.   5.  Mild microangiopathic ischemic change versus demyelination or gliosis.   6.  Left maxillary sinusitis.      EC-ECHOCARDIOGRAM COMPLETE W/O CONT   Final Result      [id number] VIEW   Final Result      No metallic density identified in the abdomen or pelvis.      DX-CHEST-PORTABLE (1 VIEW)   Final Result      No acute cardiopulmonary abnormality.      CT-CTA NECK WITH & W/O-POST PROCESSING   Final Result      1.  Scattered atherosclerotic plaque within the bilateral common and internal carotid arteries with less than 50% stenosis. Probable small ulcerated plaque at the left carotid bifurcation.   2.  Mild narrowing within the right subclavian artery and at the left vertebral artery origin.      CT-CTA HEAD WITH & W/O-POST PROCESS   Final Result      1.  Occlusion at the left MCA origin which may be chronic due to chronic infarcts of the left temporal lobe and basal ganglia.   2.  There is flow in more distal left MCA branches likely due to pial collaterals.   3.  15 mm enhancing lesion in the floor of the right middle cranial fossa is likely extra-axial and probably a small meningioma. Brain MRI may be performed for further evaluation.      CT-HEAD W/O   Final Result      1.  Chronic ischemic changes.   2.  No acute intracranial abnormality.   3.  Left maxillary sinus disease.           Assessment/Plan  * History of CVA (cerebrovascular accident)- (present on admission)  Assessment & Plan  ASA, Lipitor  PT/OT/SLP       Cognitive impairment- (present on admission)  Assessment & Plan  Psychiatry - pt is  INCAPACITATED  to make medical and placement decisions. 8/4/2020    Noncompliance- (present on admission)  Assessment & Plan  Psychiatry - pt is INCAPACITATED  to make medical and placement decisions 8/4/2020    Dysphagia- (present on admission)  Assessment & Plan  Texture modifier level 6,  liquid level 0  SLP to follow    Prediabetes- (present on admission)  Assessment & Plan  Diabetic diet  Will need metformin on discharge       VTE prophylaxis: Lovenox

## 2020-08-05 NOTE — ASSESSMENT & PLAN NOTE
- per psychiatry, pt is INCAPACITATED to make medical and placement decisions.  Brother is now making decisions.  Per CM/SW, no need for guardianship right now, as patient is amenable to going to a long-term facility in [place].  They will have to formalize the brother being the POA once he is in [place].

## 2020-08-06 PROCEDURE — 99225 PR SUBSEQUENT OBSERVATION CARE,LEVEL II: CPT | Performed by: FAMILY MEDICINE

## 2020-08-06 PROCEDURE — 92526 ORAL FUNCTION THERAPY: CPT

## 2020-08-06 PROCEDURE — G0378 HOSPITAL OBSERVATION PER HR: HCPCS

## 2020-08-06 PROCEDURE — 97530 THERAPEUTIC ACTIVITIES: CPT

## 2020-08-06 ASSESSMENT — ENCOUNTER SYMPTOMS
NAUSEA: 0
DIZZINESS: 0
NECK PAIN: 0
SHORTNESS OF BREATH: 0
PALPITATIONS: 0
WEAKNESS: 0
BACK PAIN: 0
HEADACHES: 0
WHEEZING: 0
ABDOMINAL PAIN: 0
SPEECH CHANGE: 1
HEARTBURN: 0
FOCAL WEAKNESS: 1
CHILLS: 0
COUGH: 0
SENSORY CHANGE: 0
FLANK PAIN: 0
MYALGIAS: 0
SORE THROAT: 0
VOMITING: 0
FEVER: 0
NERVOUS/ANXIOUS: 0
DIAPHORESIS: 0
DIARRHEA: 0
BLURRED VISION: 0

## 2020-08-06 ASSESSMENT — COGNITIVE AND FUNCTIONAL STATUS - GENERAL
MOBILITY SCORE: 17
WALKING IN HOSPITAL ROOM: TOTAL
STANDING UP FROM CHAIR USING ARMS: A LITTLE
SUGGESTED CMS G CODE MODIFIER MOBILITY: CK
CLIMB 3 TO 5 STEPS WITH RAILING: TOTAL

## 2020-08-06 ASSESSMENT — GAIT ASSESSMENTS: GAIT LEVEL OF ASSIST: UNABLE TO PARTICIPATE

## 2020-08-06 NOTE — THERAPY
Physical Therapy   Daily Treatment     Patient Name: Chuy Luis  Age:  70 y.o., Sex:  male  Medical Record #: [id number]  Today's Date: 8/6/2020     Precautions: Fall Risk, Swallow Precautions ( See Comments)    Assessment    Pt appears at baseline LOF, requiring SPV for all transfers for safety as pt has poor safety awareness. Pt is non-ambulatory and uses a manual w/c to propel level surfaces using L LE. Pt demonstrating expressive aphasia from previous CVA.  Pt is discharged from PT at current functional level. Encouraged pt to get OOB with nsg staff for rest of stay. No further acute skilled PT needs. Pt will require placement at discharge, either LTC or [facility].    Plan    Discharge secondary to no likely benefit. Pt is at baseline LOF.    DC Equipment Recommendations: None  Discharge Recommendations: Anticipate that the patient will have no further physical therapy needs after discharge from the hospital      Subjective    Pt agrees to PT.     Objective       08/06/20 1055   Cognition    Speech/ Communication Expressive Aphasia;Slurred   Level of Consciousness Alert   Safety Awareness Impaired;Impulsive   Comments Cooperative   Passive ROM Lower Body   Passive ROM Lower Body WDL   Active ROM Lower Body    Comments R LE limited t/o due to paresis   Strength Lower Body   Rt Hip Flexion Strength 2+ (P+)   Rt Knee Flexion Strength 2+ (P+)   Rt Knee Extension Strength 2+ (P+)   Rt Ankle Dorsiflexion Strength 2+ (P+)   Rt Ankle Plantar Flexion Strength 2+ (P+)   Comments L LE grossly WDLs t/o.   Balance   Sitting Balance (Static) Good   Sitting Balance (Dynamic) Fair +   Weight Shift Sitting Good   Weight Shift Standing Fair   Skilled Intervention Verbal Cuing   Comments Pt stood while performing stand-pivot transfers on L LE.   Gait Analysis   Gait Level Of Assist Unable to Participate   Bed Mobility    Supine to Sit Modified Independent   Sit to Supine Modified Independent   Functional Mobility   Sit to Stand  Supervised   Bed, Chair, Wheelchair Transfer Supervised   Transfer Method Stand Pivot   Mobility bed>chair>w/c>chair   Wheelchair Assist Modified Independent   Distance Wheelchair (Feet or Distance) 200   Skilled Intervention Verbal Cuing;Facilitation   Comments w/c in kang on flat surfaces   Patient / Family Goals    Patient / Family Goal #1 None stated.   Short Term Goals    Short Term Goal # 1 Will demonstrate mod-I bed mobility with head of bed flat in 6tx.   Goal Outcome # 1 Goal met   Short Term Goal # 2 Will transfer to/from wheelchair with mod-I in 6tx to demosntrate decreased risk for falls during transfer.   Goal Outcome # 2 Goal not met   Education Group   Role of Physical Therapist Patient Response Patient;Acceptance;Demonstration;Explanation;Action Demonstration;Reinforcement Needed   Anticipated Discharge Equipment and Recommendations   DC Equipment Recommendations None   Discharge Recommendations Anticipate that the patient will have no further physical therapy needs after discharge from the hospital

## 2020-08-06 NOTE — PROGRESS NOTES
[facility] Medicine Daily Progress Note    Date of Service  8/6/2020    Chief Complaint  70 y.o. male admitted 7/31/2020 with Aphasia.    Hospital Course  Admitted with expressive aphasia, with history of CVA with residual deficit of right upper extremity weakness and aphasia.  MRI showed no evidence of acute infarct.  Recent previous admission for same, where he was noncompliant and refused medications, interventions, and imaging.  Was previously seen by psychiatry and deemed to have incapacity to make medical decisions.  Seen by SLP for cognitive evaluation and assessment was unable to assess cognition alone given severe expressive and mild-moderate receptive language deficits.  Reevaluation done by psychiatry on this admission, patient with incapacity to make medical and placement decisions.    Interval Problem Update  CVA - residual deficit of right upper extremity weakness and expressive aphasia  Cognitive impairment - per Psychiatry pt is  INCAPACITATED  to make medical and placement decisions, possible guardianship per     Consultants/Specialty  Psychiatry    Code Status  Full    Disposition  Guardianship?    Review of Systems  Review of Systems   Constitutional: Negative for chills, diaphoresis, fever and malaise/fatigue.   HENT: Negative for congestion, hearing loss and sore throat.    Eyes: Negative for blurred vision.   Respiratory: Negative for cough, shortness of breath and wheezing.    Cardiovascular: Negative for chest pain, palpitations and leg swelling.   Gastrointestinal: Negative for abdominal pain, diarrhea, heartburn, nausea and vomiting.   Genitourinary: Negative for dysuria, flank pain and hematuria.   Musculoskeletal: Negative for back pain, joint pain, myalgias and neck pain.   Skin: Negative for rash.   Neurological: Positive for speech change and focal weakness. Negative for dizziness, sensory change, weakness and headaches.   Psychiatric/Behavioral: The patient is not nervous/anxious.          Physical Exam  Temp:  [36.1 °C (97 °F)-36.4 °C (97.5 °F)] 36.1 °C (97 °F)  Pulse:  [66-75] 75  Resp:  [16] 16  BP: (110-125)/(65-78) 110/65  SpO2:  [94 %-99 %] 99 %    Physical Exam  Vitals signs and nursing note reviewed.   HENT:      Head: Normocephalic and atraumatic.      Nose: No congestion.      Mouth/Throat:      Mouth: Mucous membranes are moist.   Eyes:      Extraocular Movements: Extraocular movements intact.      Conjunctiva/sclera: Conjunctivae normal.      Pupils: Pupils are equal, round, and reactive to light.   Neck:      Musculoskeletal: No muscular tenderness.   Cardiovascular:      Rate and Rhythm: Normal rate and regular rhythm.   Pulmonary:      Effort: Pulmonary effort is normal.      Breath sounds: Normal breath sounds.   Abdominal:      General: Bowel sounds are normal. There is no distension.      Palpations: Abdomen is soft.      Tenderness: There is no abdominal tenderness. There is no guarding or rebound.   Musculoskeletal:      Right lower leg: No edema.      Left lower leg: No edema.   Lymphadenopathy:      Cervical: No cervical adenopathy.   Skin:     General: Skin is warm and dry.   Neurological:      Mental Status: He is alert and oriented to person, place, and time.      Cranial Nerves: Dysarthria present.      Motor: Weakness (MMT RUE 0/5, RLE/LUE/LLE 5/5) present.         Fluids    Intake/Output Summary (Last 24 hours) at 8/6/2020 1135  Last data filed at 8/6/2020 0800  Gross per 24 hour   Intake 480 ml   Output 475 ml   Net 5 ml       Laboratory      Recent Labs     08/04/20  0740   SODIUM 137   POTASSIUM 3.7   CHLORIDE 99   CO2 27   GLUCOSE 122*   BUN 8   CREATININE 0.63   CALCIUM 9.1                   Imaging  EC-SHILPA W/O CONT   Final Result      MR-BRAIN-W/O   Final Result      1.  No evidence of acute territorial infarct, intracranial hemorrhage or mass lesion.   2.  Chronic left middle cerebral artery territory infarct.   3.  Slow flow or occlusion of the left middle  cerebral artery M1 and M2 segments.   4.  Mild diffuse cerebral substance loss.   5.  Mild microangiopathic ischemic change versus demyelination or gliosis.   6.  Left maxillary sinusitis.      EC-ECHOCARDIOGRAM COMPLETE W/O CONT   Final Result      [id number] VIEW   Final Result      No metallic density identified in the abdomen or pelvis.      DX-CHEST-PORTABLE (1 VIEW)   Final Result      No acute cardiopulmonary abnormality.      CT-CTA NECK WITH & W/O-POST PROCESSING   Final Result      1.  Scattered atherosclerotic plaque within the bilateral common and internal carotid arteries with less than 50% stenosis. Probable small ulcerated plaque at the left carotid bifurcation.   2.  Mild narrowing within the right subclavian artery and at the left vertebral artery origin.      CT-CTA HEAD WITH & W/O-POST PROCESS   Final Result      1.  Occlusion at the left MCA origin which may be chronic due to chronic infarcts of the left temporal lobe and basal ganglia.   2.  There is flow in more distal left MCA branches likely due to pial collaterals.   3.  15 mm enhancing lesion in the floor of the right middle cranial fossa is likely extra-axial and probably a small meningioma. Brain MRI may be performed for further evaluation.      CT-HEAD W/O   Final Result      1.  Chronic ischemic changes.   2.  No acute intracranial abnormality.   3.  Left maxillary sinus disease.           Assessment/Plan  * History of CVA (cerebrovascular accident)- (present on admission)  Assessment & Plan  ASA, Lipitor  PT/OT/SLP       Cognitive impairment- (present on admission)  Assessment & Plan  Psychiatry - pt is  INCAPACITATED  to make medical and placement decisions. 8/4/2020    Noncompliance- (present on admission)  Assessment & Plan  Psychiatry - pt is INCAPACITATED  to make medical and placement decisions 8/4/2020    Dysphagia- (present on admission)  Assessment & Plan  Texture modifier level 6, liquid level 0  SLP to  follow    Prediabetes- (present on admission)  Assessment & Plan  Diabetic diet  Will need metformin on discharge       VTE prophylaxis: Lovenox

## 2020-08-06 NOTE — CARE PLAN
Problem: Nutritional:  Goal: Achieve adequate nutritional intake  Description: Patient will resume PO diet and consume >50% of meals  Outcome: MET  Per chart pt PO % on diabetic, soft and bite sized diet with thin liquids. RD available prn.

## 2020-08-06 NOTE — THERAPY
Speech Language Pathology  Daily Treatment     Patient Name: Chuy Luis  Age:  70 y.o., Sex:  male  Medical Record #: [id number]  Today's Date: 8/6/2020     Precautions  Precautions: Fall Risk, Swallow Precautions ( See Comments)  Comments: Hx of CVA, hx of leaving AMA, per previous psych notes, does not have capacity     Assessment  Patient seen this date for dysphagia tx session. Patient currently on SB6/TN0 diet and per RN, patient appears to be tolerating diet without difficulty. Patient still presents with expressive aphasia and reported this has been baseline since CVA in January (unsure if pt is accurate historian?). However, patient has different EMR under MRN #[id number] and just recently had a formal aphasia evaluation completed with SLP on 7/27 with SLP. Therefore, another formal aphasia evaluation is not warranted at this time and will not be completed d/t same. Patient is edentulous and nodded head that he prefers foods chopped up d/t same, as well as RUE weakness. Patient consumed PO trials of soft solids, crackers, mixed consistencies, and thin liquids via cup sip and straw. Patient consumed all trials with no s/sx of aspiration. Laryngeal elevation palpated as weak and initiation of swallow trigger was timely.     At this time, recommend patient continue SB6/TN0 diet, as this is patient's preference and appears to be baseline for him. Patient does not require any further acute SLP services for dysphagia. Consider outpatient SLP services for aphasia, if patient is agreeable. Patient would benefit from 24/7 supervision upon d/c to ensure safety.     Plan  Discharge secondary to goals met.     Objective     08/06/20 1107   Precautions   Comments Hx of CVA, hx of leaving AMA, per previous psych notes, does not have capacity    Vitals   O2 Delivery Device None - Room Air   Dysphagia    Positioning / Behavior Modification Self Monitoring;Modulate Rate or Bite Size;Cough / Clear after Swallow   Diet / Liquid  "Recommendation Soft & Bite-Sized (6) - (Dysphagia III);Thin (0)   Nursing Communication Swallow Precaution Sign Posted at Head of Bed   Skilled Intervention Verbal Cueing;Compensatory Strategies   Recommended Route of Medication Administration   Medication Administration  Whole with Liquid Wash   Patient / Family Goals   Patient / Family Goal #1 \"I haven't eaten in days\"   Goal #1 Outcome Goal met   Short Term Goals   Short Term Goal # 1 Pt will consume PO diet of SB6/TN0 with no clinical s/sx of aspiration/penetration.   Goal Outcome # 1 Goal met         "

## 2020-08-06 NOTE — CARE PLAN
Problem: Communication  Goal: The ability to communicate needs accurately and effectively will improve  Outcome: PROGRESSING AS EXPECTED     Problem: Safety  Goal: Will remain free from injury  Outcome: PROGRESSING AS EXPECTED     Problem: Knowledge Deficit  Goal: Knowledge of disease process/condition, treatment plan, diagnostic tests, and medications will improve  Outcome: PROGRESSING AS EXPECTED     Problem: Safety:  Goal: Will remain free from injury  Outcome: PROGRESSING AS EXPECTED     Problem: Knowledge Deficit:  Goal: Knowledge of disease process/condition, treatment plan, diagnostic tests, and medications will improve  Outcome: PROGRESSING AS EXPECTED

## 2020-08-07 LAB
ALBUMIN SERPL BCP-MCNC: 4.2 G/DL (ref 3.2–4.9)
ALBUMIN/GLOB SERPL: 1.5 G/DL
ALP SERPL-CCNC: 81 U/L (ref 30–99)
ALT SERPL-CCNC: 17 U/L (ref 2–50)
ANION GAP SERPL CALC-SCNC: 12 MMOL/L (ref 7–16)
AST SERPL-CCNC: 12 U/L (ref 12–45)
BASOPHILS # BLD AUTO: 0.5 % (ref 0–1.8)
BASOPHILS # BLD: 0.02 K/UL (ref 0–0.12)
BILIRUB SERPL-MCNC: 0.5 MG/DL (ref 0.1–1.5)
BUN SERPL-MCNC: 11 MG/DL (ref 8–22)
CALCIUM SERPL-MCNC: 9.7 MG/DL (ref 8.5–10.5)
CHLORIDE SERPL-SCNC: 101 MMOL/L (ref 96–112)
CO2 SERPL-SCNC: 26 MMOL/L (ref 20–33)
CREAT SERPL-MCNC: 0.65 MG/DL (ref 0.5–1.4)
EOSINOPHIL # BLD AUTO: 0.14 K/UL (ref 0–0.51)
EOSINOPHIL NFR BLD: 3.2 % (ref 0–6.9)
ERYTHROCYTE [DISTWIDTH] IN BLOOD BY AUTOMATED COUNT: 42.3 FL (ref 35.9–50)
GLOBULIN SER CALC-MCNC: 2.8 G/DL (ref 1.9–3.5)
GLUCOSE SERPL-MCNC: 129 MG/DL (ref 65–99)
HCT VFR BLD AUTO: 48.7 % (ref 42–52)
HGB BLD-MCNC: 15.8 G/DL (ref 14–18)
IMM GRANULOCYTES # BLD AUTO: 0.01 K/UL (ref 0–0.11)
IMM GRANULOCYTES NFR BLD AUTO: 0.2 % (ref 0–0.9)
LYMPHOCYTES # BLD AUTO: 1.46 K/UL (ref 1–4.8)
LYMPHOCYTES NFR BLD: 32.9 % (ref 22–41)
MCH RBC QN AUTO: 28 PG (ref 27–33)
MCHC RBC AUTO-ENTMCNC: 32.4 G/DL (ref 33.7–35.3)
MCV RBC AUTO: 86.2 FL (ref 81.4–97.8)
MONOCYTES # BLD AUTO: 0.28 K/UL (ref 0–0.85)
MONOCYTES NFR BLD AUTO: 6.3 % (ref 0–13.4)
NEUTROPHILS # BLD AUTO: 2.53 K/UL (ref 1.82–7.42)
NEUTROPHILS NFR BLD: 56.9 % (ref 44–72)
NRBC # BLD AUTO: 0 K/UL
NRBC BLD-RTO: 0 /100 WBC
PLATELET # BLD AUTO: 211 K/UL (ref 164–446)
PMV BLD AUTO: 9.4 FL (ref 9–12.9)
POTASSIUM SERPL-SCNC: 4.4 MMOL/L (ref 3.6–5.5)
PROT SERPL-MCNC: 7 G/DL (ref 6–8.2)
RBC # BLD AUTO: 5.65 M/UL (ref 4.7–6.1)
SODIUM SERPL-SCNC: 139 MMOL/L (ref 135–145)
WBC # BLD AUTO: 4.4 K/UL (ref 4.8–10.8)

## 2020-08-07 PROCEDURE — 36415 COLL VENOUS BLD VENIPUNCTURE: CPT

## 2020-08-07 PROCEDURE — 80053 COMPREHEN METABOLIC PANEL: CPT

## 2020-08-07 PROCEDURE — 97535 SELF CARE MNGMENT TRAINING: CPT

## 2020-08-07 PROCEDURE — A9270 NON-COVERED ITEM OR SERVICE: HCPCS | Performed by: FAMILY MEDICINE

## 2020-08-07 PROCEDURE — 700111 HCHG RX REV CODE 636 W/ 250 OVERRIDE (IP): Performed by: INTERNAL MEDICINE

## 2020-08-07 PROCEDURE — 700102 HCHG RX REV CODE 250 W/ 637 OVERRIDE(OP): Performed by: FAMILY MEDICINE

## 2020-08-07 PROCEDURE — G0378 HOSPITAL OBSERVATION PER HR: HCPCS

## 2020-08-07 PROCEDURE — 85025 COMPLETE CBC W/AUTO DIFF WBC: CPT

## 2020-08-07 PROCEDURE — A9270 NON-COVERED ITEM OR SERVICE: HCPCS | Performed by: INTERNAL MEDICINE

## 2020-08-07 PROCEDURE — 99225 PR SUBSEQUENT OBSERVATION CARE,LEVEL II: CPT | Performed by: FAMILY MEDICINE

## 2020-08-07 PROCEDURE — 700102 HCHG RX REV CODE 250 W/ 637 OVERRIDE(OP): Performed by: INTERNAL MEDICINE

## 2020-08-07 RX ADMIN — ASPIRIN 325 MG: 325 TABLET, FILM COATED ORAL at 04:09

## 2020-08-07 RX ADMIN — ATORVASTATIN CALCIUM 80 MG: 80 TABLET, FILM COATED ORAL at 16:28

## 2020-08-07 RX ADMIN — ENOXAPARIN SODIUM 40 MG: 40 INJECTION SUBCUTANEOUS at 04:09

## 2020-08-07 RX ADMIN — METFORMIN HYDROCHLORIDE 500 MG: 500 TABLET ORAL at 13:15

## 2020-08-07 ASSESSMENT — ENCOUNTER SYMPTOMS
NAUSEA: 0
WEAKNESS: 0
CHILLS: 0
HEARTBURN: 0
SPEECH CHANGE: 1
FEVER: 0
MYALGIAS: 0
SORE THROAT: 0
FOCAL WEAKNESS: 1
SHORTNESS OF BREATH: 0
VOMITING: 0
DIARRHEA: 0
NERVOUS/ANXIOUS: 0
WHEEZING: 0
DIAPHORESIS: 0
BLURRED VISION: 0
COUGH: 0
FLANK PAIN: 0
PALPITATIONS: 0
ABDOMINAL PAIN: 0
HEADACHES: 0
BACK PAIN: 0
NECK PAIN: 0
SENSORY CHANGE: 0
DIZZINESS: 0

## 2020-08-07 ASSESSMENT — COGNITIVE AND FUNCTIONAL STATUS - GENERAL
SUGGESTED CMS G CODE MODIFIER DAILY ACTIVITY: CJ
DRESSING REGULAR LOWER BODY CLOTHING: A LITTLE
HELP NEEDED FOR BATHING: A LITTLE
TOILETING: A LITTLE
DAILY ACTIVITIY SCORE: 21

## 2020-08-07 NOTE — THERAPY
Occupational Therapy  Daily Treatment     Patient Name: Chuy Luis  Age:  70 y.o., Sex:  male  Medical Record #: [id number]  Today's Date: 8/7/2020     Precautions  Precautions: Fall Risk, Swallow Precautions ( See Comments)  Comments: Hx of CVA w/ R deficits, hx of leaving AMA, per previous psych notes, does not have capacity     Assessment    He is at a supv level for basic ADLs and functional transfers, most limited by impaired cognition and safety awareness.    Plan    Patient has achieved highest practical level of function in an acute care setting and will not be actively followed by acute occupational therapy services. Pt will likely require long term placement (group home vs snf), pt is not able to care for himself.     DC Equipment Recommendations: None  Discharge Recommendations: Other -(see note)       08/07/20 1120   Balance   Comments for transfer only   Bed Mobility    Supine to Sit Supervised   Sit to Supine Supervised   Scooting Supervised   Activities of Daily Living   Grooming Supervision;Seated   Lower Body Dressing Supervision  (don underwear)   Functional Mobility   Sit to Stand Supervised   Bed, Chair, Wheelchair Transfer Supervised   Transfer Method Stand Pivot   Mobility chair>w/c>bed>w/c>chair   Wheelchair Assist Supervised   Short Term Goals   Short Term Goal # 1 pt will complete toilet txf w/spv    Goal Outcome # 1 Goal met   Short Term Goal # 2 pt will complete LB dressing w/spv    Goal Outcome # 2 Goal met   Short Term Goal # 3 pt will complete seated shower w/set up   Goal Outcome # 3 Goal not met  (has done with nursing)

## 2020-08-07 NOTE — CARE PLAN
Problem: Safety  Goal: Will remain free from falls  Outcome: PROGRESSING AS EXPECTED  Note: Patient at risk for falls, pt wheelchair bound, bed alarm on, walker out of sight, nonskid socks on, call light within reach, personal belongings within reach, toileting offered.        Problem: Communication  Goal: The ability to communicate needs accurately and effectively will improve  Outcome: PROGRESSING SLOWER THAN EXPECTED  Note: Encouraged use of call light, assessed needs, encouraged pt to voice feelings.

## 2020-08-07 NOTE — CARE PLAN
Problem: Communication  Goal: The ability to communicate needs accurately and effectively will improve  Outcome: PROGRESSING AS EXPECTED     Problem: Safety  Goal: Will remain free from injury  Outcome: PROGRESSING AS EXPECTED  Goal: Will remain free from falls  Outcome: PROGRESSING AS EXPECTED     Problem: Skin Integrity  Goal: Risk for impaired skin integrity will decrease  Outcome: PROGRESSING AS EXPECTED     Problem: Mobility  Goal: Risk for activity intolerance will decrease  Outcome: PROGRESSING AS EXPECTED     Problem: Safety:  Goal: Will remain free from injury  Outcome: PROGRESSING AS EXPECTED

## 2020-08-07 NOTE — DISCHARGE PLANNING
Anticipated Discharge Disposition:   Long Term Care Facility    Action:    RN CM spoke to patient's brother Sid today via telephone.  Informed him that patient is at his baseline per the OT/PT/SLP and recommending long term placement.  Provided information on [facility] in [facility] where Sid lives.  Consent obtained for a referral to [facility].  Choice form and application for admission faxed to [facility].  Sid scheduled to be in [place] approximately 8-.  It is a 17 hour drive one way.    RN CM spoke with patient and informed him that communications have been underway with his brother who would like him to go live in [facility].  Discussed [facility].  Pt agreeable.    Barriers to Discharge:    Placement acceptance  Transportation    Plan:    F/U referral.  F/U with patient and brother.

## 2020-08-07 NOTE — PROGRESS NOTES
[facility] Medicine Daily Progress Note    Date of Service  8/7/2020    Chief Complaint  70 y.o. male admitted 7/31/2020 with Aphasia.    Hospital Course  Admitted with expressive aphasia, with history of CVA with residual deficit of right upper extremity weakness and aphasia.  MRI showed no evidence of acute infarct.  Recent previous admission for same, where he was noncompliant and refused medications, interventions, and imaging.  Was previously seen by psychiatry and deemed to have incapacity to make medical decisions.  Seen by SLP for cognitive evaluation and assessment was unable to assess cognition alone given severe expressive and mild-moderate receptive language deficits.  Reevaluation done by psychiatry on this admission, patient with incapacity to make medical and placement decisions.    Interval Problem Update  CVA - residual deficit of right upper extremity weakness and expressive aphasia  Cognitive impairment - per Psychiatry pt is  INCAPACITATED  to make medical and placement decisions, possible guardianship per     Consultants/Specialty  Psychiatry    Code Status  Full    Disposition  Guardianship?    Review of Systems  Review of Systems   Constitutional: Negative for chills, diaphoresis, fever and malaise/fatigue.   HENT: Negative for congestion, hearing loss and sore throat.    Eyes: Negative for blurred vision.   Respiratory: Negative for cough, shortness of breath and wheezing.    Cardiovascular: Negative for chest pain, palpitations and leg swelling.   Gastrointestinal: Negative for abdominal pain, diarrhea, heartburn, nausea and vomiting.   Genitourinary: Negative for dysuria, flank pain and hematuria.   Musculoskeletal: Negative for back pain, joint pain, myalgias and neck pain.   Skin: Negative for rash.   Neurological: Positive for speech change and focal weakness. Negative for dizziness, sensory change, weakness and headaches.   Psychiatric/Behavioral: The patient is not nervous/anxious.          Physical Exam  Temp:  [36.1 °C (96.9 °F)-36.4 °C (97.6 °F)] 36.1 °C (96.9 °F)  Pulse:  [57-78] 71  Resp:  [16-18] 16  BP: (101-134)/(67-74) 101/67  SpO2:  [96 %-99 %] 98 %    Physical Exam  Vitals signs and nursing note reviewed.   HENT:      Head: Normocephalic and atraumatic.      Nose: No congestion.      Mouth/Throat:      Mouth: Mucous membranes are moist.   Eyes:      Extraocular Movements: Extraocular movements intact.      Conjunctiva/sclera: Conjunctivae normal.      Pupils: Pupils are equal, round, and reactive to light.   Neck:      Musculoskeletal: No muscular tenderness.   Cardiovascular:      Rate and Rhythm: Normal rate and regular rhythm.   Pulmonary:      Effort: Pulmonary effort is normal.      Breath sounds: Normal breath sounds.   Abdominal:      General: Bowel sounds are normal. There is no distension.      Palpations: Abdomen is soft.      Tenderness: There is no abdominal tenderness. There is no guarding or rebound.   Musculoskeletal:      Right lower leg: No edema.      Left lower leg: No edema.   Lymphadenopathy:      Cervical: No cervical adenopathy.   Skin:     General: Skin is warm and dry.   Neurological:      Mental Status: He is alert and oriented to person, place, and time.      Cranial Nerves: Dysarthria present.      Motor: Weakness (MMT RUE 0/5, RLE/LUE/LLE 5/5) present.         Fluids    Intake/Output Summary (Last 24 hours) at 8/7/2020 1145  Last data filed at 8/7/2020 0800  Gross per 24 hour   Intake 560 ml   Output 180 ml   Net 380 ml       Laboratory  Recent Labs     08/07/20  0749   WBC 4.4*   RBC 5.65   HEMOGLOBIN 15.8   HEMATOCRIT 48.7   MCV 86.2   MCH 28.0   MCHC 32.4*   RDW 42.3   PLATELETCT 211   MPV 9.4     Recent Labs     08/07/20  0749   SODIUM 139   POTASSIUM 4.4   CHLORIDE 101   CO2 26   GLUCOSE 129*   BUN 11   CREATININE 0.65   CALCIUM 9.7                   Imaging  EC-SHILPA W/O CONT   Final Result      MR-BRAIN-W/O   Final Result      1.  No evidence of acute  territorial infarct, intracranial hemorrhage or mass lesion.   2.  Chronic left middle cerebral artery territory infarct.   3.  Slow flow or occlusion of the left middle cerebral artery M1 and M2 segments.   4.  Mild diffuse cerebral substance loss.   5.  Mild microangiopathic ischemic change versus demyelination or gliosis.   6.  Left maxillary sinusitis.      EC-ECHOCARDIOGRAM COMPLETE W/O CONT   Final Result      [id number] VIEW   Final Result      No metallic density identified in the abdomen or pelvis.      DX-CHEST-PORTABLE (1 VIEW)   Final Result      No acute cardiopulmonary abnormality.      CT-CTA NECK WITH & W/O-POST PROCESSING   Final Result      1.  Scattered atherosclerotic plaque within the bilateral common and internal carotid arteries with less than 50% stenosis. Probable small ulcerated plaque at the left carotid bifurcation.   2.  Mild narrowing within the right subclavian artery and at the left vertebral artery origin.      CT-CTA HEAD WITH & W/O-POST PROCESS   Final Result      1.  Occlusion at the left MCA origin which may be chronic due to chronic infarcts of the left temporal lobe and basal ganglia.   2.  There is flow in more distal left MCA branches likely due to pial collaterals.   3.  15 mm enhancing lesion in the floor of the right middle cranial fossa is likely extra-axial and probably a small meningioma. Brain MRI may be performed for further evaluation.      CT-HEAD W/O   Final Result      1.  Chronic ischemic changes.   2.  No acute intracranial abnormality.   3.  Left maxillary sinus disease.           Assessment/Plan  * History of CVA (cerebrovascular accident)- (present on admission)  Assessment & Plan  ASA, Lipitor  PT/OT/SLP       Cognitive impairment- (present on admission)  Assessment & Plan  Psychiatry - pt is  INCAPACITATED  to make medical and placement decisions. 8/4/2020    Noncompliance- (present on admission)  Assessment & Plan  Psychiatry - pt is INCAPACITATED  to  make medical and placement decisions 8/4/2020    Dysphagia- (present on admission)  Assessment & Plan  Texture modifier level 6, liquid level 0  SLP to follow    Prediabetes- (present on admission)  Assessment & Plan  Diabetic diet  Start Metformin       VTE prophylaxis: Lovenox

## 2020-08-07 NOTE — DISCHARGE PLANNING
KATHERINE manually faxed [facility] application, MD progress note and therapy notes to [phone].

## 2020-08-08 PROBLEM — R33.9 URINARY RETENTION: Status: ACTIVE | Noted: 2020-08-08

## 2020-08-08 PROCEDURE — A9270 NON-COVERED ITEM OR SERVICE: HCPCS | Performed by: INTERNAL MEDICINE

## 2020-08-08 PROCEDURE — 700102 HCHG RX REV CODE 250 W/ 637 OVERRIDE(OP): Performed by: INTERNAL MEDICINE

## 2020-08-08 PROCEDURE — 700102 HCHG RX REV CODE 250 W/ 637 OVERRIDE(OP): Performed by: FAMILY MEDICINE

## 2020-08-08 PROCEDURE — A9270 NON-COVERED ITEM OR SERVICE: HCPCS | Performed by: FAMILY MEDICINE

## 2020-08-08 PROCEDURE — 700111 HCHG RX REV CODE 636 W/ 250 OVERRIDE (IP): Performed by: INTERNAL MEDICINE

## 2020-08-08 PROCEDURE — G0378 HOSPITAL OBSERVATION PER HR: HCPCS

## 2020-08-08 PROCEDURE — 99225 PR SUBSEQUENT OBSERVATION CARE,LEVEL II: CPT | Performed by: FAMILY MEDICINE

## 2020-08-08 PROCEDURE — 51798 US URINE CAPACITY MEASURE: CPT

## 2020-08-08 RX ORDER — TAMSULOSIN HYDROCHLORIDE 0.4 MG/1
0.4 CAPSULE ORAL
Status: DISCONTINUED | OUTPATIENT
Start: 2020-08-08 | End: 2020-08-17 | Stop reason: HOSPADM

## 2020-08-08 RX ADMIN — ASPIRIN 325 MG: 325 TABLET, FILM COATED ORAL at 04:47

## 2020-08-08 RX ADMIN — METFORMIN HYDROCHLORIDE 500 MG: 500 TABLET ORAL at 04:47

## 2020-08-08 RX ADMIN — ENOXAPARIN SODIUM 40 MG: 40 INJECTION SUBCUTANEOUS at 04:47

## 2020-08-08 RX ADMIN — TAMSULOSIN HYDROCHLORIDE 0.4 MG: 0.4 CAPSULE ORAL at 15:20

## 2020-08-08 RX ADMIN — ATORVASTATIN CALCIUM 80 MG: 80 TABLET, FILM COATED ORAL at 17:47

## 2020-08-08 ASSESSMENT — ENCOUNTER SYMPTOMS
FEVER: 0
HEARTBURN: 0
CHILLS: 0
FOCAL WEAKNESS: 1
SORE THROAT: 0
MYALGIAS: 0
DIARRHEA: 0
FLANK PAIN: 0
WHEEZING: 0
NERVOUS/ANXIOUS: 0
COUGH: 0
BACK PAIN: 0
DIZZINESS: 0
DIAPHORESIS: 0
BLURRED VISION: 0
SHORTNESS OF BREATH: 0
NECK PAIN: 0
NAUSEA: 0
PALPITATIONS: 0
SENSORY CHANGE: 0
ABDOMINAL PAIN: 0
HEADACHES: 0
VOMITING: 0
SPEECH CHANGE: 1
WEAKNESS: 0

## 2020-08-08 ASSESSMENT — FIBROSIS 4 INDEX: FIB4 SCORE: 0.94

## 2020-08-08 NOTE — CARE PLAN
Problem: Safety  Goal: Will remain free from falls  Outcome: PROGRESSING AS EXPECTED  Note: Has treaded slipper socks on, bed in the lowest and locked position, bed alarm on, call light within reach, educated pt on need to call for assistance, adequate lighting     Problem: Communication:  Goal: The ability to communicate needs accurately and effectively will improve  Outcome: PROGRESSING SLOWER THAN EXPECTED  Note: Pt is still showing signs of expressive aphasia

## 2020-08-08 NOTE — PROGRESS NOTES
[facility] Medicine Daily Progress Note    Date of Service  8/8/2020    Chief Complaint  70 y.o. male admitted 7/31/2020 with Aphasia.    Hospital Course  Admitted with expressive aphasia, with history of CVA with residual deficit of right upper extremity weakness and aphasia.  MRI showed no evidence of acute infarct.  Recent previous admission for same, where he was noncompliant and refused medications, interventions, and imaging.  Was previously seen by psychiatry and deemed to have incapacity to make medical decisions.  Seen by SLP for cognitive evaluation and assessment was unable to assess cognition alone given severe expressive and mild-moderate receptive language deficits.  Reevaluation done by psychiatry on this admission, patient with incapacity to make medical and placement decisions.    Interval Problem Update  CVA - residual deficit of right upper extremity weakness and expressive aphasia  Cognitive impairment - per Psychiatry pt is  INCAPACITATED  to make medical and placement decisions, possible guardianship per SW  Urinary retention - bladder scan > 700 cc    Consultants/Specialty  Psychiatry    Code Status  Full    Disposition  Guardianship?    Review of Systems  Review of Systems   Constitutional: Negative for chills, diaphoresis, fever and malaise/fatigue.   HENT: Negative for congestion, hearing loss and sore throat.    Eyes: Negative for blurred vision.   Respiratory: Negative for cough, shortness of breath and wheezing.    Cardiovascular: Negative for chest pain, palpitations and leg swelling.   Gastrointestinal: Negative for abdominal pain, diarrhea, heartburn, nausea and vomiting.   Genitourinary: Negative for dysuria, flank pain and hematuria.   Musculoskeletal: Negative for back pain, joint pain, myalgias and neck pain.   Skin: Negative for rash.   Neurological: Positive for speech change and focal weakness. Negative for dizziness, sensory change, weakness and headaches.    Psychiatric/Behavioral: The patient is not nervous/anxious.         Physical Exam  Temp:  [35.9 °C (96.7 °F)-36.8 °C (98.2 °F)] 36.3 °C (97.4 °F)  Pulse:  [60-78] 78  Resp:  [16] 16  BP: ()/(66-85) 99/66  SpO2:  [93 %-97 %] 96 %    Physical Exam  Vitals signs and nursing note reviewed.   HENT:      Head: Normocephalic and atraumatic.      Nose: No congestion.      Mouth/Throat:      Mouth: Mucous membranes are moist.   Eyes:      Extraocular Movements: Extraocular movements intact.      Conjunctiva/sclera: Conjunctivae normal.      Pupils: Pupils are equal, round, and reactive to light.   Neck:      Musculoskeletal: No muscular tenderness.   Cardiovascular:      Rate and Rhythm: Normal rate and regular rhythm.   Pulmonary:      Effort: Pulmonary effort is normal.      Breath sounds: Normal breath sounds.   Abdominal:      General: Bowel sounds are normal. There is no distension.      Palpations: Abdomen is soft.      Tenderness: There is no abdominal tenderness. There is no guarding or rebound.   Musculoskeletal:      Right lower leg: No edema.      Left lower leg: No edema.   Lymphadenopathy:      Cervical: No cervical adenopathy.   Skin:     General: Skin is warm and dry.   Neurological:      Mental Status: He is alert and oriented to person, place, and time.      Cranial Nerves: Dysarthria present.      Motor: Weakness (MMT RUE 0/5, RLE/LUE/LLE 5/5) present.         Fluids    Intake/Output Summary (Last 24 hours) at 8/8/2020 1405  Last data filed at 8/8/2020 0500  Gross per 24 hour   Intake 140 ml   Output 700 ml   Net -560 ml       Laboratory  Recent Labs     08/07/20  0749   WBC 4.4*   RBC 5.65   HEMOGLOBIN 15.8   HEMATOCRIT 48.7   MCV 86.2   MCH 28.0   MCHC 32.4*   RDW 42.3   PLATELETCT 211   MPV 9.4     Recent Labs     08/07/20  0749   SODIUM 139   POTASSIUM 4.4   CHLORIDE 101   CO2 26   GLUCOSE 129*   BUN 11   CREATININE 0.65   CALCIUM 9.7                   Imaging  EC-SHILPA W/O CONT   Final Result       MR-BRAIN-W/O   Final Result      1.  No evidence of acute territorial infarct, intracranial hemorrhage or mass lesion.   2.  Chronic left middle cerebral artery territory infarct.   3.  Slow flow or occlusion of the left middle cerebral artery M1 and M2 segments.   4.  Mild diffuse cerebral substance loss.   5.  Mild microangiopathic ischemic change versus demyelination or gliosis.   6.  Left maxillary sinusitis.      EC-ECHOCARDIOGRAM COMPLETE W/O CONT   Final Result      [id number] VIEW   Final Result      No metallic density identified in the abdomen or pelvis.      DX-CHEST-PORTABLE (1 VIEW)   Final Result      No acute cardiopulmonary abnormality.      CT-CTA NECK WITH & W/O-POST PROCESSING   Final Result      1.  Scattered atherosclerotic plaque within the bilateral common and internal carotid arteries with less than 50% stenosis. Probable small ulcerated plaque at the left carotid bifurcation.   2.  Mild narrowing within the right subclavian artery and at the left vertebral artery origin.      CT-CTA HEAD WITH & W/O-POST PROCESS   Final Result      1.  Occlusion at the left MCA origin which may be chronic due to chronic infarcts of the left temporal lobe and basal ganglia.   2.  There is flow in more distal left MCA branches likely due to pial collaterals.   3.  15 mm enhancing lesion in the floor of the right middle cranial fossa is likely extra-axial and probably a small meningioma. Brain MRI may be performed for further evaluation.      CT-HEAD W/O   Final Result      1.  Chronic ischemic changes.   2.  No acute intracranial abnormality.   3.  Left maxillary sinus disease.           Assessment/Plan  * History of CVA (cerebrovascular accident)- (present on admission)  Assessment & Plan  ASA, Lipitor  PT/OT/SLP       Cognitive impairment- (present on admission)  Assessment & Plan  Psychiatry - pt is  INCAPACITATED  to make medical and placement decisions. 8/4/2020    Urinary retention  Assessment &  Plan  Start Flomax  Bladder scan and straight cath prn > 350 cc    Noncompliance- (present on admission)  Assessment & Plan  Psychiatry - pt is INCAPACITATED  to make medical and placement decisions 8/4/2020    Dysphagia- (present on admission)  Assessment & Plan  Texture modifier level 6, liquid level 0  SLP to follow    Prediabetes- (present on admission)  Assessment & Plan  Diabetic diet  Metformin       VTE prophylaxis: Lovenox

## 2020-08-08 NOTE — PROGRESS NOTES
Pt voided 250 mL, pt was having difficulty and still feels like he has to void. Post void bladder scan results 724 mL. MD notified, straight cath ordered x2

## 2020-08-09 PROCEDURE — 700111 HCHG RX REV CODE 636 W/ 250 OVERRIDE (IP): Performed by: INTERNAL MEDICINE

## 2020-08-09 PROCEDURE — G0378 HOSPITAL OBSERVATION PER HR: HCPCS

## 2020-08-09 PROCEDURE — A9270 NON-COVERED ITEM OR SERVICE: HCPCS | Performed by: FAMILY MEDICINE

## 2020-08-09 PROCEDURE — 700102 HCHG RX REV CODE 250 W/ 637 OVERRIDE(OP): Performed by: INTERNAL MEDICINE

## 2020-08-09 PROCEDURE — 99225 PR SUBSEQUENT OBSERVATION CARE,LEVEL II: CPT | Performed by: FAMILY MEDICINE

## 2020-08-09 PROCEDURE — A9270 NON-COVERED ITEM OR SERVICE: HCPCS | Performed by: INTERNAL MEDICINE

## 2020-08-09 PROCEDURE — 700102 HCHG RX REV CODE 250 W/ 637 OVERRIDE(OP): Performed by: FAMILY MEDICINE

## 2020-08-09 RX ADMIN — ENOXAPARIN SODIUM 40 MG: 40 INJECTION SUBCUTANEOUS at 04:56

## 2020-08-09 RX ADMIN — ASPIRIN 325 MG: 325 TABLET, FILM COATED ORAL at 04:56

## 2020-08-09 RX ADMIN — ATORVASTATIN CALCIUM 80 MG: 80 TABLET, FILM COATED ORAL at 17:21

## 2020-08-09 RX ADMIN — TAMSULOSIN HYDROCHLORIDE 0.4 MG: 0.4 CAPSULE ORAL at 09:40

## 2020-08-09 RX ADMIN — METFORMIN HYDROCHLORIDE 500 MG: 500 TABLET ORAL at 04:56

## 2020-08-09 ASSESSMENT — ENCOUNTER SYMPTOMS
FEVER: 0
BLURRED VISION: 0
FLANK PAIN: 0
HEADACHES: 0
MYALGIAS: 0
NAUSEA: 0
BACK PAIN: 0
FOCAL WEAKNESS: 1
NERVOUS/ANXIOUS: 0
VOMITING: 0
PALPITATIONS: 0
DIAPHORESIS: 0
HEARTBURN: 0
WHEEZING: 0
COUGH: 0
DIARRHEA: 0
ABDOMINAL PAIN: 0
SORE THROAT: 0
NECK PAIN: 0
SENSORY CHANGE: 0
WEAKNESS: 0
SHORTNESS OF BREATH: 0
SPEECH CHANGE: 1
CHILLS: 0
DIZZINESS: 0

## 2020-08-09 NOTE — CARE PLAN
Problem: Safety  Goal: Will remain free from injury  Outcome: PROGRESSING AS EXPECTED  Note: Patient at risk for falls due to stroke, bed alarm on, nonskid socks on, call light within reach, personal belongings within reach, toileting offered.          Problem: Communication  Goal: The ability to communicate needs accurately and effectively will improve  Outcome: PROGRESSING SLOWER THAN EXPECTED  Note: Encouraged use of call light, assessed needs, encouraged pt to voice feelings.

## 2020-08-09 NOTE — PROGRESS NOTES
[facility] Medicine Daily Progress Note    Date of Service  8/9/2020    Chief Complaint  70 y.o. male admitted 7/31/2020 with Aphasia.    Hospital Course  Admitted with expressive aphasia, with history of CVA with residual deficit of right upper extremity weakness and aphasia.  MRI showed no evidence of acute infarct.  Recent previous admission for same, where he was noncompliant and refused medications, interventions, and imaging.  Was previously seen by psychiatry and deemed to have incapacity to make medical decisions.  Seen by SLP for cognitive evaluation and assessment was unable to assess cognition alone given severe expressive and mild-moderate receptive language deficits.  Reevaluation done by psychiatry on this admission, patient with incapacity to make medical and placement decisions.    Interval Problem Update  CVA - residual deficit of right upper extremity weakness and expressive aphasia  Cognitive impairment - per Psychiatry pt INCAPACITATED  to make medical and placement decisions, for guardianship     Consultants/Specialty  Psychiatry    Code Status  Full    Disposition  Guardianship    Review of Systems  Review of Systems   Constitutional: Negative for chills, diaphoresis, fever and malaise/fatigue.   HENT: Negative for congestion, hearing loss and sore throat.    Eyes: Negative for blurred vision.   Respiratory: Negative for cough, shortness of breath and wheezing.    Cardiovascular: Negative for chest pain, palpitations and leg swelling.   Gastrointestinal: Negative for abdominal pain, diarrhea, heartburn, nausea and vomiting.   Genitourinary: Negative for dysuria, flank pain and hematuria.   Musculoskeletal: Negative for back pain, joint pain, myalgias and neck pain.   Skin: Negative for rash.   Neurological: Positive for speech change and focal weakness. Negative for dizziness, sensory change, weakness and headaches.   Psychiatric/Behavioral: The patient is not nervous/anxious.         Physical  Exam  Temp:  [36.4 °C (97.5 °F)-36.7 °C (98 °F)] 36.7 °C (98 °F)  Pulse:  [60-83] 83  Resp:  [16-18] 17  BP: ()/(59-79) 97/59  SpO2:  [97 %-99 %] 97 %    Physical Exam  Vitals signs and nursing note reviewed.   HENT:      Head: Normocephalic and atraumatic.      Nose: No congestion.      Mouth/Throat:      Mouth: Mucous membranes are moist.   Eyes:      Extraocular Movements: Extraocular movements intact.      Conjunctiva/sclera: Conjunctivae normal.      Pupils: Pupils are equal, round, and reactive to light.   Neck:      Musculoskeletal: No muscular tenderness.   Cardiovascular:      Rate and Rhythm: Normal rate and regular rhythm.   Pulmonary:      Effort: Pulmonary effort is normal.      Breath sounds: Normal breath sounds.   Abdominal:      General: Bowel sounds are normal. There is no distension.      Palpations: Abdomen is soft.      Tenderness: There is no abdominal tenderness. There is no guarding or rebound.   Musculoskeletal:      Right lower leg: No edema.      Left lower leg: No edema.   Lymphadenopathy:      Cervical: No cervical adenopathy.   Skin:     General: Skin is warm and dry.   Neurological:      Mental Status: He is alert and oriented to person, place, and time.      Cranial Nerves: Dysarthria present.      Motor: Weakness (MMT RUE 0/5, RLE/LUE/LLE 5/5) present.         Fluids  No intake or output data in the 24 hours ending 08/09/20 1115    Laboratory  Recent Labs     08/07/20  0749   WBC 4.4*   RBC 5.65   HEMOGLOBIN 15.8   HEMATOCRIT 48.7   MCV 86.2   MCH 28.0   MCHC 32.4*   RDW 42.3   PLATELETCT 211   MPV 9.4     Recent Labs     08/07/20  0749   SODIUM 139   POTASSIUM 4.4   CHLORIDE 101   CO2 26   GLUCOSE 129*   BUN 11   CREATININE 0.65   CALCIUM 9.7                   Imaging  EC-SHILPA W/O CONT   Final Result      MR-BRAIN-W/O   Final Result      1.  No evidence of acute territorial infarct, intracranial hemorrhage or mass lesion.   2.  Chronic left middle cerebral artery territory  infarct.   3.  Slow flow or occlusion of the left middle cerebral artery M1 and M2 segments.   4.  Mild diffuse cerebral substance loss.   5.  Mild microangiopathic ischemic change versus demyelination or gliosis.   6.  Left maxillary sinusitis.      EC-ECHOCARDIOGRAM COMPLETE W/O CONT   Final Result      [id number] VIEW   Final Result      No metallic density identified in the abdomen or pelvis.      DX-CHEST-PORTABLE (1 VIEW)   Final Result      No acute cardiopulmonary abnormality.      CT-CTA NECK WITH & W/O-POST PROCESSING   Final Result      1.  Scattered atherosclerotic plaque within the bilateral common and internal carotid arteries with less than 50% stenosis. Probable small ulcerated plaque at the left carotid bifurcation.   2.  Mild narrowing within the right subclavian artery and at the left vertebral artery origin.      CT-CTA HEAD WITH & W/O-POST PROCESS   Final Result      1.  Occlusion at the left MCA origin which may be chronic due to chronic infarcts of the left temporal lobe and basal ganglia.   2.  There is flow in more distal left MCA branches likely due to pial collaterals.   3.  15 mm enhancing lesion in the floor of the right middle cranial fossa is likely extra-axial and probably a small meningioma. Brain MRI may be performed for further evaluation.      CT-HEAD W/O   Final Result      1.  Chronic ischemic changes.   2.  No acute intracranial abnormality.   3.  Left maxillary sinus disease.           Assessment/Plan  * History of CVA (cerebrovascular accident)- (present on admission)  Assessment & Plan  ASA, Lipitor  PT/OT/SLP       Cognitive impairment- (present on admission)  Assessment & Plan  Psychiatry - pt is  INCAPACITATED  to make medical and placement decisions. 8/4/2020    Urinary retention  Assessment & Plan  Start Flomax  Bladder scan and straight cath prn > 350 cc    Noncompliance- (present on admission)  Assessment & Plan  Psychiatry - pt is INCAPACITATED  to make medical and  placement decisions 8/4/2020    Dysphagia- (present on admission)  Assessment & Plan  Texture modifier level 6, liquid level 0  SLP to follow    Prediabetes- (present on admission)  Assessment & Plan  Diabetic diet  Metformin       VTE prophylaxis: Lovenox

## 2020-08-09 NOTE — CARE PLAN
Problem: Communication  Goal: The ability to communicate needs accurately and effectively will improve  Outcome: PROGRESSING SLOWER THAN EXPECTED  Note: Pt has expressive aphasia with slight receptive aphasia and has difficulty communicating needs     Problem: Safety  Goal: Will remain free from falls  Outcome: PROGRESSING AS EXPECTED  Note: Educated pt to call for assistance, treaded slipper socks on, bed in lowest and locked position, clutter free environment, adequate lighting, bed alarm on, call light within reach

## 2020-08-09 NOTE — CARE PLAN
Problem: Safety  Goal: Will remain free from falls  Outcome: PROGRESSING AS EXPECTED  Note: Patient at risk for falls, bed alarm on, walker out of sight, nonskid socks on, call light within reach, personal belongings within reach, toileting offered.        Problem: Communication  Goal: The ability to communicate needs accurately and effectively will improve  Outcome: PROGRESSING SLOWER THAN EXPECTED  Note: Encouraged use of call light, assessed needs, encouraged pt to voice feelings.

## 2020-08-10 LAB
ANION GAP SERPL CALC-SCNC: 11 MMOL/L (ref 7–16)
BUN SERPL-MCNC: 11 MG/DL (ref 8–22)
CALCIUM SERPL-MCNC: 9.7 MG/DL (ref 8.5–10.5)
CHLORIDE SERPL-SCNC: 100 MMOL/L (ref 96–112)
CO2 SERPL-SCNC: 28 MMOL/L (ref 20–33)
CREAT SERPL-MCNC: 0.68 MG/DL (ref 0.5–1.4)
GLUCOSE SERPL-MCNC: 109 MG/DL (ref 65–99)
POTASSIUM SERPL-SCNC: 4.2 MMOL/L (ref 3.6–5.5)
SODIUM SERPL-SCNC: 139 MMOL/L (ref 135–145)

## 2020-08-10 PROCEDURE — 36415 COLL VENOUS BLD VENIPUNCTURE: CPT

## 2020-08-10 PROCEDURE — G0378 HOSPITAL OBSERVATION PER HR: HCPCS

## 2020-08-10 PROCEDURE — A9270 NON-COVERED ITEM OR SERVICE: HCPCS | Performed by: FAMILY MEDICINE

## 2020-08-10 PROCEDURE — 99225 PR SUBSEQUENT OBSERVATION CARE,LEVEL II: CPT | Performed by: FAMILY MEDICINE

## 2020-08-10 PROCEDURE — 80048 BASIC METABOLIC PNL TOTAL CA: CPT

## 2020-08-10 PROCEDURE — A9270 NON-COVERED ITEM OR SERVICE: HCPCS | Performed by: INTERNAL MEDICINE

## 2020-08-10 PROCEDURE — 700102 HCHG RX REV CODE 250 W/ 637 OVERRIDE(OP): Performed by: INTERNAL MEDICINE

## 2020-08-10 PROCEDURE — 700102 HCHG RX REV CODE 250 W/ 637 OVERRIDE(OP): Performed by: FAMILY MEDICINE

## 2020-08-10 PROCEDURE — 700111 HCHG RX REV CODE 636 W/ 250 OVERRIDE (IP): Performed by: INTERNAL MEDICINE

## 2020-08-10 RX ADMIN — ENOXAPARIN SODIUM 40 MG: 40 INJECTION SUBCUTANEOUS at 04:37

## 2020-08-10 RX ADMIN — ATORVASTATIN CALCIUM 80 MG: 80 TABLET, FILM COATED ORAL at 16:48

## 2020-08-10 RX ADMIN — METFORMIN HYDROCHLORIDE 500 MG: 500 TABLET ORAL at 04:37

## 2020-08-10 RX ADMIN — ASPIRIN 325 MG: 325 TABLET, FILM COATED ORAL at 04:37

## 2020-08-10 RX ADMIN — TAMSULOSIN HYDROCHLORIDE 0.4 MG: 0.4 CAPSULE ORAL at 09:21

## 2020-08-10 ASSESSMENT — ENCOUNTER SYMPTOMS
FEVER: 0
WHEEZING: 0
SENSORY CHANGE: 0
COUGH: 0
NAUSEA: 0
BLURRED VISION: 0
DIZZINESS: 0
FOCAL WEAKNESS: 1
HEARTBURN: 0
HEADACHES: 0
DIAPHORESIS: 0
BACK PAIN: 0
FLANK PAIN: 0
SHORTNESS OF BREATH: 0
VOMITING: 0
SPEECH CHANGE: 1
NERVOUS/ANXIOUS: 0
ABDOMINAL PAIN: 0
DIARRHEA: 0
CHILLS: 0
SORE THROAT: 0
WEAKNESS: 0
PALPITATIONS: 0
NECK PAIN: 0
MYALGIAS: 0

## 2020-08-10 NOTE — CARE PLAN
Problem: Knowledge Deficit  Goal: Knowledge of disease process/condition, treatment plan, diagnostic tests, and medications will improve  Outcome: PROGRESSING AS EXPECTED  Note: POC discussed with patient and all questions answered.      Problem: Communication  Goal: The ability to communicate needs accurately and effectively will improve  Outcome: PROGRESSING SLOWER THAN EXPECTED  Note: Patient continues with expressive aphasia.

## 2020-08-10 NOTE — CARE PLAN
Problem: Communication:  Goal: The ability to communicate needs accurately and effectively will improve  Outcome: PROGRESSING SLOWER THAN EXPECTED  Note: Pt continues to have expressive aphasia     Problem: Safety  Goal: Will remain free from falls  Outcome: PROGRESSING AS EXPECTED  Note: Pt has treaded slipper socks on, clutter free environment, adequate lighting, bed in the lowest and locked position, educated on calling for assistance

## 2020-08-10 NOTE — PROGRESS NOTES
[facility] Medicine Daily Progress Note    Date of Service  8/10/2020    Chief Complaint  70 y.o. male admitted 7/31/2020 with Aphasia.    Hospital Course  Admitted with expressive aphasia, with history of CVA with residual deficit of right upper extremity weakness and aphasia.  MRI showed no evidence of acute infarct.  Recent previous admission for same, where he was noncompliant and refused medications, interventions, and imaging.  Was previously seen by psychiatry and deemed to have incapacity to make medical decisions.  Seen by SLP for cognitive evaluation and assessment was unable to assess cognition alone given severe expressive and mild-moderate receptive language deficits.  Reevaluation done by psychiatry on this admission, patient with incapacity to make medical and placement decisions.    Interval Problem Update  CVA - residual deficit of right upper extremity weakness and expressive aphasia  Cognitive impairment - per Psychiatry pt INCAPACITATED  to make medical and placement decisions, for guardianship, excellent appetite    Consultants/Specialty  Psychiatry    Code Status  Full    Disposition  Guardianship    Review of Systems  Review of Systems   Constitutional: Negative for chills, diaphoresis, fever and malaise/fatigue.   HENT: Negative for congestion, hearing loss and sore throat.    Eyes: Negative for blurred vision.   Respiratory: Negative for cough, shortness of breath and wheezing.    Cardiovascular: Negative for chest pain, palpitations and leg swelling.   Gastrointestinal: Negative for abdominal pain, diarrhea, heartburn, nausea and vomiting.   Genitourinary: Negative for dysuria, flank pain and hematuria.   Musculoskeletal: Negative for back pain, joint pain, myalgias and neck pain.   Skin: Negative for rash.   Neurological: Positive for speech change and focal weakness. Negative for dizziness, sensory change, weakness and headaches.   Psychiatric/Behavioral: The patient is not nervous/anxious.          Physical Exam  Temp:  [36.2 °C (97.2 °F)-36.6 °C (97.9 °F)] 36.3 °C (97.4 °F)  Pulse:  [64-85] 85  Resp:  [16-18] 18  BP: ()/(63-79) 95/63  SpO2:  [96 %-99 %] 98 %    Physical Exam  Vitals signs and nursing note reviewed.   HENT:      Head: Normocephalic and atraumatic.      Nose: No congestion.      Mouth/Throat:      Mouth: Mucous membranes are moist.   Eyes:      Extraocular Movements: Extraocular movements intact.      Conjunctiva/sclera: Conjunctivae normal.      Pupils: Pupils are equal, round, and reactive to light.   Neck:      Musculoskeletal: No muscular tenderness.   Cardiovascular:      Rate and Rhythm: Normal rate and regular rhythm.   Pulmonary:      Effort: Pulmonary effort is normal.      Breath sounds: Normal breath sounds.   Abdominal:      General: Bowel sounds are normal. There is no distension.      Palpations: Abdomen is soft.      Tenderness: There is no abdominal tenderness. There is no guarding or rebound.   Musculoskeletal:      Right lower leg: No edema.      Left lower leg: No edema.   Lymphadenopathy:      Cervical: No cervical adenopathy.   Skin:     General: Skin is warm and dry.   Neurological:      Mental Status: He is alert and oriented to person, place, and time.      Cranial Nerves: Dysarthria present.      Motor: Weakness (MMT RUE 0/5, RLE/LUE/LLE 5/5) present.         Fluids  No intake or output data in the 24 hours ending 08/10/20 1048    Laboratory      Recent Labs     08/10/20  0805   SODIUM 139   POTASSIUM 4.2   CHLORIDE 100   CO2 28   GLUCOSE 109*   BUN 11   CREATININE 0.68   CALCIUM 9.7                   Imaging  EC-SHILPA W/O CONT   Final Result      MR-BRAIN-W/O   Final Result      1.  No evidence of acute territorial infarct, intracranial hemorrhage or mass lesion.   2.  Chronic left middle cerebral artery territory infarct.   3.  Slow flow or occlusion of the left middle cerebral artery M1 and M2 segments.   4.  Mild diffuse cerebral substance loss.   5.   Mild microangiopathic ischemic change versus demyelination or gliosis.   6.  Left maxillary sinusitis.      EC-ECHOCARDIOGRAM COMPLETE W/O CONT   Final Result      [id number] VIEW   Final Result      No metallic density identified in the abdomen or pelvis.      DX-CHEST-PORTABLE (1 VIEW)   Final Result      No acute cardiopulmonary abnormality.      CT-CTA NECK WITH & W/O-POST PROCESSING   Final Result      1.  Scattered atherosclerotic plaque within the bilateral common and internal carotid arteries with less than 50% stenosis. Probable small ulcerated plaque at the left carotid bifurcation.   2.  Mild narrowing within the right subclavian artery and at the left vertebral artery origin.      CT-CTA HEAD WITH & W/O-POST PROCESS   Final Result      1.  Occlusion at the left MCA origin which may be chronic due to chronic infarcts of the left temporal lobe and basal ganglia.   2.  There is flow in more distal left MCA branches likely due to pial collaterals.   3.  15 mm enhancing lesion in the floor of the right middle cranial fossa is likely extra-axial and probably a small meningioma. Brain MRI may be performed for further evaluation.      CT-HEAD W/O   Final Result      1.  Chronic ischemic changes.   2.  No acute intracranial abnormality.   3.  Left maxillary sinus disease.           Assessment/Plan  * History of CVA (cerebrovascular accident)- (present on admission)  Assessment & Plan  ASA, Lipitor  PT/OT/SLP       Cognitive impairment- (present on admission)  Assessment & Plan  Psychiatry - pt is INCAPACITATED to make medical and placement decisions. 8/4/2020    Urinary retention  Assessment & Plan  Flomax    Noncompliance- (present on admission)  Assessment & Plan  Psychiatry - pt is INCAPACITATED  to make medical and placement decisions 8/4/2020    Dysphagia- (present on admission)  Assessment & Plan  Texture modifier level 6, liquid level 0  SLP to follow    Prediabetes- (present on admission)  Assessment &  Plan  Diabetic diet  Metformin       VTE prophylaxis: Lovenox

## 2020-08-11 PROCEDURE — 700102 HCHG RX REV CODE 250 W/ 637 OVERRIDE(OP): Performed by: FAMILY MEDICINE

## 2020-08-11 PROCEDURE — A9270 NON-COVERED ITEM OR SERVICE: HCPCS | Performed by: FAMILY MEDICINE

## 2020-08-11 PROCEDURE — 700102 HCHG RX REV CODE 250 W/ 637 OVERRIDE(OP): Performed by: INTERNAL MEDICINE

## 2020-08-11 PROCEDURE — 99224 PR SUBSEQUENT OBSERVATION CARE,LEVEL I: CPT | Performed by: INTERNAL MEDICINE

## 2020-08-11 PROCEDURE — 700111 HCHG RX REV CODE 636 W/ 250 OVERRIDE (IP): Performed by: INTERNAL MEDICINE

## 2020-08-11 PROCEDURE — A9270 NON-COVERED ITEM OR SERVICE: HCPCS | Performed by: INTERNAL MEDICINE

## 2020-08-11 PROCEDURE — G0378 HOSPITAL OBSERVATION PER HR: HCPCS

## 2020-08-11 RX ADMIN — METFORMIN HYDROCHLORIDE 500 MG: 500 TABLET ORAL at 05:13

## 2020-08-11 RX ADMIN — TAMSULOSIN HYDROCHLORIDE 0.4 MG: 0.4 CAPSULE ORAL at 09:17

## 2020-08-11 RX ADMIN — ENOXAPARIN SODIUM 40 MG: 40 INJECTION SUBCUTANEOUS at 05:13

## 2020-08-11 RX ADMIN — ATORVASTATIN CALCIUM 80 MG: 80 TABLET, FILM COATED ORAL at 18:10

## 2020-08-11 RX ADMIN — ASPIRIN 325 MG: 325 TABLET, FILM COATED ORAL at 05:13

## 2020-08-11 RX ADMIN — DOCUSATE SODIUM 50 MG AND SENNOSIDES 8.6 MG 2 TABLET: 8.6; 5 TABLET, FILM COATED ORAL at 18:11

## 2020-08-11 ASSESSMENT — PATIENT HEALTH QUESTIONNAIRE - PHQ9
1. LITTLE INTEREST OR PLEASURE IN DOING THINGS: NOT AT ALL
SUM OF ALL RESPONSES TO PHQ9 QUESTIONS 1 AND 2: 0
2. FEELING DOWN, DEPRESSED, IRRITABLE, OR HOPELESS: NOT AT ALL

## 2020-08-11 NOTE — CARE PLAN
Problem: Communication  Goal: The ability to communicate needs accurately and effectively will improve  Outcome: PROGRESSING AS EXPECTED     Problem: Safety  Goal: Will remain free from injury  Outcome: PROGRESSING AS EXPECTED  Goal: Will remain free from falls  Outcome: PROGRESSING AS EXPECTED     Problem: Safety:  Goal: Will remain free from injury  Outcome: PROGRESSING AS EXPECTED

## 2020-08-11 NOTE — CARE PLAN
Problem: Communication  Goal: The ability to communicate needs accurately and effectively will improve  Outcome: PROGRESSING AS EXPECTED  Intervention: Educate patient and significant other/support system about the plan of care, procedures, treatments, medications and allow for questions  Note: A/Ox3-4, aphasic. Able to make needs known. WCTM     Problem: Safety  Goal: Will remain free from injury  Outcome: PROGRESSING AS EXPECTED  Intervention: Implement safety precautions  Note: WC bound at baseline. R arm contracture. R sided weakness. Call light and personal belongings within reach, able to make needs known. Hourly rounding, fall precautions, safety maintained. WCTM       Problem: Mobility  Goal: Risk for activity intolerance will decrease  Outcome: PROGRESSING AS EXPECTED     Problem: Safety:  Goal: Will remain free from injury  Outcome: PROGRESSING AS EXPECTED  Intervention: Implement safety precautions  Note: WC bound at baseline. R arm contracture. R sided weakness. Call light and personal belongings within reach, able to make needs known. Hourly rounding, fall precautions, safety maintained. WCTM

## 2020-08-11 NOTE — PROGRESS NOTES
[facility] Medicine Daily Progress Note    Date of Service  8/11/2020    Chief Complaint  Aphasia.    Hospital Course  70 y.o. male, who is usually wheelchair-bound, with recent admisssion for CVA with residual deficit of right upper extremity weakness and aphasia,  noncompliant, and refused medications, who left AMA during last admission, admitted 7/31/2020 with expressive aphasia, and found in a ditch MRI showed no evidence of acute infarct.  Notably, he was seen by psychiatry previously, and was deemed to have been capacity to make medical decisions however was still discharged then. Reevaluation done by psychiatry on this admission, patient without capacity to make medical and placement decisions.  Brother is now making decisions. PT/OT/SLP recommending long-term placement, and further evaluation deemed him to be at his baseline.    Interval Problem Update  8/11/2020 - I reviewed the patient's chart. There were no significant overnight events. Remains hemodynamically stable and afebrile. Stable on RA.  No new labs, BMP yesterday was unimpressive.    > I have personally seen and examined the patient today.  He is clinically stable.  He has no complaints.  Denies any pain.  No nausea, vomiting, abdominal pain, shortness of breath, fevers or chills.      Consultants/Specialty  Psychiatry    Code Status  Full    Disposition  LTC placement in [place].   Monitor on the floors.     Review of Systems  Review of Systems      Pertinent positives/negatives as mentioned above.     A complete review of systems was personally done by me. All other systems were negative.       Physical Exam  Temp:  [36.3 °C (97.3 °F)-36.7 °C (98.1 °F)] 36.3 °C (97.3 °F)  Pulse:  [68-81] 69  Resp:  [18] 18  BP: ()/(62-76) 92/62  SpO2:  [95 %-98 %] 98 %    Physical Exam  Vitals signs and nursing note reviewed.   Constitutional:       General: He is not in acute distress.     Appearance: Normal appearance. He is not toxic-appearing or  diaphoretic.      Comments: Wheelchair-bound.   HENT:      Head: Normocephalic and atraumatic.      Right Ear: External ear normal.      Left Ear: External ear normal.      Nose: No congestion.      Mouth/Throat:      Mouth: Mucous membranes are moist.      Pharynx: No oropharyngeal exudate.   Eyes:      General: No scleral icterus.     Extraocular Movements: Extraocular movements intact.      Conjunctiva/sclera: Conjunctivae normal.      Pupils: Pupils are equal, round, and reactive to light.   Neck:      Musculoskeletal: Normal range of motion and neck supple. No muscular tenderness.   Cardiovascular:      Rate and Rhythm: Normal rate and regular rhythm.      Heart sounds: Normal heart sounds. No murmur. No gallop.    Pulmonary:      Effort: Pulmonary effort is normal. No respiratory distress.      Breath sounds: Normal breath sounds. No stridor. No wheezing, rhonchi or rales.   Chest:      Chest wall: No tenderness.   Abdominal:      General: Bowel sounds are normal. There is no distension.      Palpations: Abdomen is soft. There is no mass.      Tenderness: There is no abdominal tenderness. There is no guarding or rebound.   Musculoskeletal: Normal range of motion.         General: No swelling.      Right lower leg: No edema.      Left lower leg: No edema.   Lymphadenopathy:      Cervical: No cervical adenopathy.   Skin:     General: Skin is warm and dry.      Coloration: Skin is not jaundiced.      Findings: No rash.   Neurological:      General: No focal deficit present.      Mental Status: He is alert and oriented to person, place, and time.      Cranial Nerves: Dysarthria present. No cranial nerve deficit.      Comments: Right upper extremity weakness.  Baseline aphasia.   Psychiatric:         Mood and Affect: Mood normal.         Behavior: Behavior normal.         Thought Content: Thought content normal.         Judgment: Judgment normal.             Fluids    Intake/Output Summary (Last 24 hours) at  8/11/2020 1259  Last data filed at 8/10/2020 1600  Gross per 24 hour   Intake 240 ml   Output --   Net 240 ml       Laboratory      Recent Labs     08/10/20  0805   SODIUM 139   POTASSIUM 4.2   CHLORIDE 100   CO2 28   GLUCOSE 109*   BUN 11   CREATININE 0.68   CALCIUM 9.7                   Imaging  EC-SHILPA W/O CONT   Final Result      MR-BRAIN-W/O   Final Result      1.  No evidence of acute territorial infarct, intracranial hemorrhage or mass lesion.   2.  Chronic left middle cerebral artery territory infarct.   3.  Slow flow or occlusion of the left middle cerebral artery M1 and M2 segments.   4.  Mild diffuse cerebral substance loss.   5.  Mild microangiopathic ischemic change versus demyelination or gliosis.   6.  Left maxillary sinusitis.      EC-ECHOCARDIOGRAM COMPLETE W/O CONT   Final Result      [id number] VIEW   Final Result      No metallic density identified in the abdomen or pelvis.      DX-CHEST-PORTABLE (1 VIEW)   Final Result      No acute cardiopulmonary abnormality.      CT-CTA NECK WITH & W/O-POST PROCESSING   Final Result      1.  Scattered atherosclerotic plaque within the bilateral common and internal carotid arteries with less than 50% stenosis. Probable small ulcerated plaque at the left carotid bifurcation.   2.  Mild narrowing within the right subclavian artery and at the left vertebral artery origin.      CT-CTA HEAD WITH & W/O-POST PROCESS   Final Result      1.  Occlusion at the left MCA origin which may be chronic due to chronic infarcts of the left temporal lobe and basal ganglia.   2.  There is flow in more distal left MCA branches likely due to pial collaterals.   3.  15 mm enhancing lesion in the floor of the right middle cranial fossa is likely extra-axial and probably a small meningioma. Brain MRI may be performed for further evaluation.      CT-HEAD W/O   Final Result      1.  Chronic ischemic changes.   2.  No acute intracranial abnormality.   3.  Left maxillary sinus disease.            Assessment/Plan  * History of CVA (cerebrovascular accident)- (present on admission)  Assessment & Plan  - continue ASA, Lipitor. Continue PT/OT/SLP .      Cognitive impairment- (present on admission)  Assessment & Plan  - per psychiatry, pt is INCAPACITATED to make medical and placement decisions.  Brother is now making decisions.  Per CM/SW, no need for guardianship right now, as patient is amenable to going to a long-term facility in [place].  They will have to formalize the brother being the POA once he is in [place].    Urinary retention  Assessment & Plan  - continue Flomax.    Dysphagia- (present on admission)  Assessment & Plan  - continue diet per SLP (texture modifier level 6, liquid level 0). SLP following.     Noncompliance- (present on admission)  Assessment & Plan  - Psychiatry - pt is INCAPACITATED  to make medical and placement decisions 8/4/2020.    Prediabetes- (present on admission)  Assessment & Plan  - maintain on diabetic diet. Continue metformin.        VTE prophylaxis: Lovenox

## 2020-08-12 PROCEDURE — A9270 NON-COVERED ITEM OR SERVICE: HCPCS | Performed by: FAMILY MEDICINE

## 2020-08-12 PROCEDURE — 700102 HCHG RX REV CODE 250 W/ 637 OVERRIDE(OP): Performed by: INTERNAL MEDICINE

## 2020-08-12 PROCEDURE — 700102 HCHG RX REV CODE 250 W/ 637 OVERRIDE(OP): Performed by: FAMILY MEDICINE

## 2020-08-12 PROCEDURE — G0378 HOSPITAL OBSERVATION PER HR: HCPCS

## 2020-08-12 PROCEDURE — 99224 PR SUBSEQUENT OBSERVATION CARE,LEVEL I: CPT | Performed by: INTERNAL MEDICINE

## 2020-08-12 PROCEDURE — 700111 HCHG RX REV CODE 636 W/ 250 OVERRIDE (IP): Performed by: INTERNAL MEDICINE

## 2020-08-12 PROCEDURE — A9270 NON-COVERED ITEM OR SERVICE: HCPCS | Performed by: INTERNAL MEDICINE

## 2020-08-12 RX ADMIN — DOCUSATE SODIUM 50 MG AND SENNOSIDES 8.6 MG 2 TABLET: 8.6; 5 TABLET, FILM COATED ORAL at 05:09

## 2020-08-12 RX ADMIN — ATORVASTATIN CALCIUM 80 MG: 80 TABLET, FILM COATED ORAL at 18:00

## 2020-08-12 RX ADMIN — ASPIRIN 325 MG: 325 TABLET, FILM COATED ORAL at 05:09

## 2020-08-12 RX ADMIN — TAMSULOSIN HYDROCHLORIDE 0.4 MG: 0.4 CAPSULE ORAL at 09:34

## 2020-08-12 RX ADMIN — METFORMIN HYDROCHLORIDE 500 MG: 500 TABLET ORAL at 05:09

## 2020-08-12 RX ADMIN — ENOXAPARIN SODIUM 40 MG: 40 INJECTION SUBCUTANEOUS at 05:09

## 2020-08-12 ASSESSMENT — PATIENT HEALTH QUESTIONNAIRE - PHQ9
2. FEELING DOWN, DEPRESSED, IRRITABLE, OR HOPELESS: NOT AT ALL
SUM OF ALL RESPONSES TO PHQ9 QUESTIONS 1 AND 2: 0
1. LITTLE INTEREST OR PLEASURE IN DOING THINGS: NOT AT ALL

## 2020-08-12 NOTE — CARE PLAN
Problem: Communication  Goal: The ability to communicate needs accurately and effectively will improve  Outcome: PROGRESSING AS EXPECTED     Problem: Safety  Goal: Will remain free from injury  Outcome: PROGRESSING AS EXPECTED  Goal: Will remain free from falls  Outcome: PROGRESSING AS EXPECTED  Note: Fall precautions in place, pt. Is up self to the WC, tranfers safely by himself, and is at baseline mobility.     Problem: Safety:  Goal: Will remain free from injury  Outcome: PROGRESSING AS EXPECTED

## 2020-08-12 NOTE — CARE PLAN
Problem: Safety  Goal: Will remain free from injury  Outcome: PROGRESSING AS EXPECTED  Note: Nonskid socks on, call light within reach, personal belongings within reach, toileting offered.        Problem: Safety:  Goal: Will remain free from injury  Outcome: PROGRESSING AS EXPECTED  Note: Nonskid socks on, call light within reach, personal belongings within reach, toileting offered.        Problem: Communication  Goal: The ability to communicate needs accurately and effectively will improve  Outcome: PROGRESSING SLOWER THAN EXPECTED  Note: Encouraged use of call light, assessed needs, encouraged pt to voice feelings.

## 2020-08-12 NOTE — DISCHARGE PLANNING
Anticipated Discharge Disposition:   Long Term Care in [place]    Action:    RN CM left vm for [facility] with Gonzalez Dillard [phone] with request to return call.  Spoke with  [phone] and she will send email to [facility] now to call RN CM.    Patient's brother, Sid telephoned this a.m.  He requested Gonzalez Dillard's telephone #s.  He left two voice mails and requested RN CM to send more referrals to local SNF/LTC facilities.  He stated I want to pick him up on the 17th and have a place ready for him.  We can change it later.  Consent obtained to send referrals to [facility], [facility], and [facility].  Choice form faxed to [facility].    Barriers to Discharge:    LTC acceptance    Plan:    F/U referrals.

## 2020-08-12 NOTE — DISCHARGE PLANNING
Received Choice form at [phone]  Agency/Facility Name: [facility]  Referral sent per Choice form @ [phone]

## 2020-08-12 NOTE — PROGRESS NOTES
Hospital Medicine Daily Progress Note    Date of Service  8/12/2020    Chief Complaint  Aphasia.    Hospital Course  70 y.o. male, who is usually wheelchair-bound, with recent admisssion for CVA with residual deficit of right upper extremity weakness and aphasia,  noncompliant, and refused medications, who left AMA during last admission, admitted 7/31/2020 with expressive aphasia, and found in a ditch MRI showed no evidence of acute infarct.  Notably, he was seen by psychiatry previously, and was deemed to have been capacity to make medical decisions however was still discharged then. Reevaluation done by psychiatry on this admission, patient without capacity to make medical and placement decisions.  Brother is now making decisions. PT/OT/SLP recommending long-term placement, and further evaluation deemed him to be at his baseline.  Brother wants him placed in a LTC facility in [place].    Interval Problem Update  8/12/2020 - I reviewed the patient's chart today. Uneventful night. VSS. Afebrile. Saturating well on RA.  No new labs today.    > I have personally seen and examined the patient today.  He has no complaints.  He states he ate his breakfast without issues, although risk she had more.  No nausea or vomiting.  No abdominal pain.  No chest pain, shortness of breath, diarrhea.          Consultants/Specialty  Psychiatry    Code Status  Full    Disposition  LTC placement in [place].   Monitor on the floors.     Review of Systems  ROS     Pertinent positives/negatives as mentioned above.     A complete review of systems was personally done by me. All other systems were negative.       Physical Exam  Temp:  [36.2 °C (97.1 °F)-36.4 °C (97.6 °F)] 36.4 °C (97.5 °F)  Pulse:  [61-70] 61  Resp:  [16-18] 16  BP: (105-131)/(65-74) 131/74  SpO2:  [95 %-98 %] 98 %    Physical Exam  Vitals signs and nursing note reviewed.   Constitutional:       General: He is not in acute distress.     Appearance: Normal appearance. He is  not toxic-appearing or diaphoretic.      Comments: Wheelchair-bound.   HENT:      Head: Normocephalic and atraumatic.      Right Ear: External ear normal.      Left Ear: External ear normal.      Nose: No congestion.      Mouth/Throat:      Mouth: Mucous membranes are moist.      Pharynx: No oropharyngeal exudate.   Eyes:      General: No scleral icterus.     Extraocular Movements: Extraocular movements intact.      Conjunctiva/sclera: Conjunctivae normal.      Pupils: Pupils are equal, round, and reactive to light.   Neck:      Musculoskeletal: Normal range of motion and neck supple. No muscular tenderness.   Cardiovascular:      Rate and Rhythm: Normal rate and regular rhythm.      Heart sounds: Normal heart sounds. No murmur. No gallop.    Pulmonary:      Effort: Pulmonary effort is normal. No respiratory distress.      Breath sounds: Normal breath sounds. No stridor. No wheezing, rhonchi or rales.   Chest:      Chest wall: No tenderness.   Abdominal:      General: Bowel sounds are normal. There is no distension.      Palpations: Abdomen is soft. There is no mass.      Tenderness: There is no abdominal tenderness. There is no guarding or rebound.   Musculoskeletal: Normal range of motion.         General: No swelling.      Right lower leg: No edema.      Left lower leg: No edema.   Lymphadenopathy:      Cervical: No cervical adenopathy.   Skin:     General: Skin is warm and dry.      Coloration: Skin is not jaundiced.      Findings: No rash.   Neurological:      General: No focal deficit present.      Mental Status: He is alert and oriented to person, place, and time.      Cranial Nerves: Dysarthria present. No cranial nerve deficit.      Comments: Right upper extremity weakness.  Baseline aphasia.   Psychiatric:         Mood and Affect: Mood normal.         Behavior: Behavior normal.         Thought Content: Thought content normal.         Judgment: Judgment normal.         I have performed the physical  examination today 8/12/2020.  In review of yesterday's note, there are no new changes except as documented above.      Fluids    Intake/Output Summary (Last 24 hours) at 8/12/2020 0739  Last data filed at 8/11/2020 0800  Gross per 24 hour   Intake 240 ml   Output --   Net 240 ml       Laboratory      Recent Labs     08/10/20  0805   SODIUM 139   POTASSIUM 4.2   CHLORIDE 100   CO2 28   GLUCOSE 109*   BUN 11   CREATININE 0.68   CALCIUM 9.7                   Imaging  EC-SHILPA W/O CONT   Final Result      MR-BRAIN-W/O   Final Result      1.  No evidence of acute territorial infarct, intracranial hemorrhage or mass lesion.   2.  Chronic left middle cerebral artery territory infarct.   3.  Slow flow or occlusion of the left middle cerebral artery M1 and M2 segments.   4.  Mild diffuse cerebral substance loss.   5.  Mild microangiopathic ischemic change versus demyelination or gliosis.   6.  Left maxillary sinusitis.      EC-ECHOCARDIOGRAM COMPLETE W/O CONT   Final Result      [id number] VIEW   Final Result      No metallic density identified in the abdomen or pelvis.      DX-CHEST-PORTABLE (1 VIEW)   Final Result      No acute cardiopulmonary abnormality.      CT-CTA NECK WITH & W/O-POST PROCESSING   Final Result      1.  Scattered atherosclerotic plaque within the bilateral common and internal carotid arteries with less than 50% stenosis. Probable small ulcerated plaque at the left carotid bifurcation.   2.  Mild narrowing within the right subclavian artery and at the left vertebral artery origin.      CT-CTA HEAD WITH & W/O-POST PROCESS   Final Result      1.  Occlusion at the left MCA origin which may be chronic due to chronic infarcts of the left temporal lobe and basal ganglia.   2.  There is flow in more distal left MCA branches likely due to pial collaterals.   3.  15 mm enhancing lesion in the floor of the right middle cranial fossa is likely extra-axial and probably a small meningioma. Brain MRI may be performed  for further evaluation.      CT-HEAD W/O   Final Result      1.  Chronic ischemic changes.   2.  No acute intracranial abnormality.   3.  Left maxillary sinus disease.           Assessment/Plan  * History of CVA (cerebrovascular accident)- (present on admission)  Assessment & Plan  - continue ASA, Lipitor. Continue PT/OT/SLP .      Cognitive impairment- (present on admission)  Assessment & Plan  - per psychiatry, pt is INCAPACITATED to make medical and placement decisions.  Brother is now making decisions.  Per CM/SW, no need for guardianship right now, as patient is amenable to going to a long-term facility in [place].  They will have to formalize the brother being the POA once he is in [place].    Urinary retention  Assessment & Plan  - continue Flomax.    Dysphagia- (present on admission)  Assessment & Plan  - continue diet per SLP (texture modifier level 6, liquid level 0). SLP following.     Noncompliance- (present on admission)  Assessment & Plan  - Psychiatry - pt is INCAPACITATED  to make medical and placement decisions 8/4/2020.    Prediabetes- (present on admission)  Assessment & Plan  - maintain on diabetic diet. Continue metformin.        VTE prophylaxis: Lovenox    I have performed the physical examination, and reviewed updated ROS and plan today 8/12/2020.  In review of yesterday's note, there are no new changes except as documented above.

## 2020-08-13 PROCEDURE — A9270 NON-COVERED ITEM OR SERVICE: HCPCS | Performed by: FAMILY MEDICINE

## 2020-08-13 PROCEDURE — 700102 HCHG RX REV CODE 250 W/ 637 OVERRIDE(OP): Performed by: FAMILY MEDICINE

## 2020-08-13 PROCEDURE — 700111 HCHG RX REV CODE 636 W/ 250 OVERRIDE (IP): Performed by: INTERNAL MEDICINE

## 2020-08-13 PROCEDURE — 700102 HCHG RX REV CODE 250 W/ 637 OVERRIDE(OP): Performed by: INTERNAL MEDICINE

## 2020-08-13 PROCEDURE — 99224 PR SUBSEQUENT OBSERVATION CARE,LEVEL I: CPT | Performed by: INTERNAL MEDICINE

## 2020-08-13 PROCEDURE — A9270 NON-COVERED ITEM OR SERVICE: HCPCS | Performed by: INTERNAL MEDICINE

## 2020-08-13 PROCEDURE — G0378 HOSPITAL OBSERVATION PER HR: HCPCS

## 2020-08-13 RX ADMIN — ENOXAPARIN SODIUM 40 MG: 40 INJECTION SUBCUTANEOUS at 05:30

## 2020-08-13 RX ADMIN — ATORVASTATIN CALCIUM 80 MG: 80 TABLET, FILM COATED ORAL at 17:14

## 2020-08-13 RX ADMIN — TAMSULOSIN HYDROCHLORIDE 0.4 MG: 0.4 CAPSULE ORAL at 09:16

## 2020-08-13 RX ADMIN — METFORMIN HYDROCHLORIDE 500 MG: 500 TABLET ORAL at 05:30

## 2020-08-13 RX ADMIN — ASPIRIN 325 MG: 325 TABLET, FILM COATED ORAL at 05:31

## 2020-08-13 NOTE — PROGRESS NOTES
Hospital Medicine Daily Progress Note    Date of Service  8/13/2020    Chief Complaint  Aphasia.    Hospital Course  70 y.o. male, who is usually wheelchair-bound, with recent admisssion for CVA with residual deficit of right upper extremity weakness and aphasia,  noncompliant, and refused medications, who left AMA during last admission, admitted 7/31/2020 with expressive aphasia, and found in a ditch MRI showed no evidence of acute infarct.  Notably, he was seen by psychiatry previously, and was deemed to have been capacity to make medical decisions however was still discharged then. Reevaluation done by psychiatry on this admission, patient without capacity to make medical and placement decisions.  Brother is now making decisions. PT/OT/SLP recommending long-term placement, and further evaluation deemed him to be at his baseline.  Brother wants him placed in a LTC facility in [place].    Interval Problem Update  8/13/2020 - I reviewed the patient's chart. There were no significant overnight events. Remains hemodynamically stable and afebrile. Stable on RA.  No new labs.  Per CM/SW notes, brother wants to pick him up on the 17th and have a place ready for him there in [place].      > I have personally seen and examined the patient today.  He has no complaints.  He is comfortable.  He states he had a bowel movement today.  Denied any pain.  No nausea, vomiting, abdominal pain.            Consultants/Specialty  Psychiatry    Code Status  Full    Disposition  LTC placement in [place].   Monitor on the floors.     Review of Systems  ROS     Pertinent positives/negatives as mentioned above.     A complete review of systems was personally done by me. All other systems were negative.       Physical Exam  Temp:  [36.4 °C (97.5 °F)-36.6 °C (97.9 °F)] 36.4 °C (97.5 °F)  Pulse:  [60-76] 62  Resp:  [16-17] 16  BP: (100-121)/(66-72) 111/72  SpO2:  [95 %-99 %] 99 %    Physical Exam  Vitals signs and nursing note reviewed.    Constitutional:       General: He is not in acute distress.     Appearance: Normal appearance. He is not toxic-appearing or diaphoretic.      Comments: Wheelchair-bound.   HENT:      Head: Normocephalic and atraumatic.      Right Ear: External ear normal.      Left Ear: External ear normal.      Nose: No congestion.      Mouth/Throat:      Mouth: Mucous membranes are moist.      Pharynx: No oropharyngeal exudate.   Eyes:      General: No scleral icterus.     Extraocular Movements: Extraocular movements intact.      Conjunctiva/sclera: Conjunctivae normal.      Pupils: Pupils are equal, round, and reactive to light.   Neck:      Musculoskeletal: Normal range of motion and neck supple. No muscular tenderness.   Cardiovascular:      Rate and Rhythm: Normal rate and regular rhythm.      Heart sounds: Normal heart sounds. No murmur. No gallop.    Pulmonary:      Effort: Pulmonary effort is normal. No respiratory distress.      Breath sounds: Normal breath sounds. No stridor. No wheezing, rhonchi or rales.   Chest:      Chest wall: No tenderness.   Abdominal:      General: Bowel sounds are normal. There is no distension.      Palpations: Abdomen is soft. There is no mass.      Tenderness: There is no abdominal tenderness. There is no guarding or rebound.   Musculoskeletal: Normal range of motion.         General: No swelling.      Right lower leg: No edema.      Left lower leg: No edema.   Lymphadenopathy:      Cervical: No cervical adenopathy.   Skin:     General: Skin is warm and dry.      Coloration: Skin is not jaundiced.      Findings: No rash.   Neurological:      General: No focal deficit present.      Mental Status: He is alert and oriented to person, place, and time.      Cranial Nerves: Dysarthria present. No cranial nerve deficit.      Comments: Right upper extremity weakness.  Baseline aphasia.   Psychiatric:         Mood and Affect: Mood normal.         Behavior: Behavior normal.         Thought Content:  Thought content normal.         Judgment: Judgment normal.         I have performed the physical examination today 8/13/2020.  In review of yesterday's note, there are no new changes except as documented above.      Fluids  No intake or output data in the 24 hours ending 08/13/20 0809    Laboratory                        Imaging  EC-SHILPA W/O CONT   Final Result      MR-BRAIN-W/O   Final Result      1.  No evidence of acute territorial infarct, intracranial hemorrhage or mass lesion.   2.  Chronic left middle cerebral artery territory infarct.   3.  Slow flow or occlusion of the left middle cerebral artery M1 and M2 segments.   4.  Mild diffuse cerebral substance loss.   5.  Mild microangiopathic ischemic change versus demyelination or gliosis.   6.  Left maxillary sinusitis.      EC-ECHOCARDIOGRAM COMPLETE W/O CONT   Final Result      [id number]-6 VIEW   Final Result      No metallic density identified in the abdomen or pelvis.      DX-CHEST-PORTABLE (1 VIEW)   Final Result      No acute cardiopulmonary abnormality.      CT-CTA NECK WITH & W/O-POST PROCESSING   Final Result      1.  Scattered atherosclerotic plaque within the bilateral common and internal carotid arteries with less than 50% stenosis. Probable small ulcerated plaque at the left carotid bifurcation.   2.  Mild narrowing within the right subclavian artery and at the left vertebral artery origin.      CT-CTA HEAD WITH & W/O-POST PROCESS   Final Result      1.  Occlusion at the left MCA origin which may be chronic due to chronic infarcts of the left temporal lobe and basal ganglia.   2.  There is flow in more distal left MCA branches likely due to pial collaterals.   3.  15 mm enhancing lesion in the floor of the right middle cranial fossa is likely extra-axial and probably a small meningioma. Brain MRI may be performed for further evaluation.      CT-HEAD W/O   Final Result      1.  Chronic ischemic changes.   2.  No acute intracranial abnormality.   3.   Left maxillary sinus disease.           Assessment/Plan  * History of CVA (cerebrovascular accident)- (present on admission)  Assessment & Plan  - continue ASA, Lipitor. Continue PT/OT/SLP .      Cognitive impairment- (present on admission)  Assessment & Plan  - per psychiatry, pt is INCAPACITATED to make medical and placement decisions.  Brother is now making decisions.  Per CM/SW, no need for guardianship right now, as patient is amenable to going to a long-term facility in [place].  They will have to formalize the brother being the POA once he is in [place].    Urinary retention  Assessment & Plan  - continue Flomax.    Dysphagia- (present on admission)  Assessment & Plan  - continue diet per SLP (texture modifier level 6, liquid level 0). SLP following.     Noncompliance- (present on admission)  Assessment & Plan  - Psychiatry - pt is INCAPACITATED  to make medical and placement decisions 8/4/2020.    Prediabetes- (present on admission)  Assessment & Plan  - maintain on diabetic diet. Continue metformin.        VTE prophylaxis: Lovenox    I have performed the physical examination, and reviewed updated ROS and plan today 8/13/2020.  In review of yesterday's note, there are no new changes except as documented above.

## 2020-08-13 NOTE — CARE PLAN
Problem: Communication  Goal: The ability to communicate needs accurately and effectively will improve  Outcome: PROGRESSING AS EXPECTED  Note: Encouraged use of call light, assessed needs, encouraged pt to voice feelings.        Problem: Safety  Goal: Will remain free from falls  Outcome: PROGRESSING AS EXPECTED  Note: Patient at risk for falls due to stroke, bed alarm on, walker out of sight, nonskid socks on, call light within reach, personal belongings within reach, toileting offered.

## 2020-08-13 NOTE — CARE PLAN
Problem: Communication  Goal: The ability to communicate needs accurately and effectively will improve  Outcome: PROGRESSING AS EXPECTED     Problem: Safety  Goal: Will remain free from injury  Outcome: PROGRESSING AS EXPECTED  Goal: Will remain free from falls  Outcome: PROGRESSING AS EXPECTED  Note: Bed alarm and fall precautions in place.      Problem: Safety:  Goal: Will remain free from injury  Outcome: PROGRESSING AS EXPECTED

## 2020-08-13 NOTE — DISCHARGE PLANNING
Agency/Facility Name: [facility]  Outcome: Pt declined; no new admits at this time.    Agency/Facility Name: [facility]  Outcome: Left vmail for Hannah in admissions, requested call back.    Agency/Facility Name: [facility]  Outcome: Left vmail for Ricki in admissions, requested call back.

## 2020-08-14 ENCOUNTER — PHARMACY VISIT (OUTPATIENT)
Dept: PHARMACY | Facility: MEDICAL CENTER | Age: 70
End: 2020-08-14
Payer: COMMERCIAL

## 2020-08-14 PROCEDURE — A9270 NON-COVERED ITEM OR SERVICE: HCPCS | Performed by: INTERNAL MEDICINE

## 2020-08-14 PROCEDURE — RXMED WILLOW AMBULATORY MEDICATION CHARGE: Performed by: INTERNAL MEDICINE

## 2020-08-14 PROCEDURE — 700102 HCHG RX REV CODE 250 W/ 637 OVERRIDE(OP): Performed by: INTERNAL MEDICINE

## 2020-08-14 PROCEDURE — G0378 HOSPITAL OBSERVATION PER HR: HCPCS

## 2020-08-14 PROCEDURE — 700111 HCHG RX REV CODE 636 W/ 250 OVERRIDE (IP): Performed by: INTERNAL MEDICINE

## 2020-08-14 PROCEDURE — A9270 NON-COVERED ITEM OR SERVICE: HCPCS | Performed by: FAMILY MEDICINE

## 2020-08-14 PROCEDURE — 99224 PR SUBSEQUENT OBSERVATION CARE,LEVEL I: CPT | Performed by: INTERNAL MEDICINE

## 2020-08-14 PROCEDURE — 700102 HCHG RX REV CODE 250 W/ 637 OVERRIDE(OP): Performed by: FAMILY MEDICINE

## 2020-08-14 RX ORDER — ASPIRIN 325 MG
325 TABLET ORAL DAILY
Qty: 100 TAB | Refills: 0 | Status: SHIPPED | OUTPATIENT
Start: 2020-08-15

## 2020-08-14 RX ORDER — ATORVASTATIN CALCIUM 80 MG/1
80 TABLET, FILM COATED ORAL EVERY EVENING
Qty: 30 TAB | Refills: 0 | Status: SHIPPED | OUTPATIENT
Start: 2020-08-14

## 2020-08-14 RX ORDER — TAMSULOSIN HYDROCHLORIDE 0.4 MG/1
0.4 CAPSULE ORAL
Qty: 30 CAP | Refills: 0 | Status: SHIPPED | OUTPATIENT
Start: 2020-08-14

## 2020-08-14 RX ADMIN — TAMSULOSIN HYDROCHLORIDE 0.4 MG: 0.4 CAPSULE ORAL at 09:35

## 2020-08-14 RX ADMIN — ASPIRIN 325 MG: 325 TABLET, FILM COATED ORAL at 05:39

## 2020-08-14 RX ADMIN — ENOXAPARIN SODIUM 40 MG: 40 INJECTION SUBCUTANEOUS at 05:39

## 2020-08-14 RX ADMIN — METFORMIN HYDROCHLORIDE 500 MG: 500 TABLET ORAL at 05:39

## 2020-08-14 RX ADMIN — ATORVASTATIN CALCIUM 80 MG: 80 TABLET, FILM COATED ORAL at 17:51

## 2020-08-14 NOTE — CARE PLAN
Problem: Urinary:  Goal: Ability to maintain continence will improve  Outcome: PROGRESSING AS EXPECTED   Patient continent  Problem: Mobility:  Goal: Mobility will improve  Outcome: PROGRESSING SLOWER THAN EXPECTED   Patient up stand by assist to wheelchair. Patients R side weak, patient able to use left side to pivot to wheelchair. Patient educated on importance of locking wheelchair wheels prior to pivoting.

## 2020-08-14 NOTE — CARE PLAN
Problem: Communication  Goal: The ability to communicate needs accurately and effectively will improve  Outcome: PROGRESSING AS EXPECTED  Note: Encouraged use of call light, assessed needs, encouraged pt to voice feelings.        Problem: Safety  Goal: Will remain free from falls  Outcome: PROGRESSING AS EXPECTED  Note: Patient at risk for falls due to stroke, bed alarm on, nonskid socks on, call light within reach, personal belongings within reach, toileting offered.

## 2020-08-14 NOTE — DISCHARGE PLANNING
Medication reconcilliation completed. Medications delivered to RN at bedside. Written information regarding the dispensed prescriptions was provided to the patient including the phone number of the pharmacy to call for any questions.         Chuy Luis   Home Medication Instructions SULY:[id number]    Printed on:08/14/20 7363   Medication Information                      aspirin (ASA) 325 MG Tab  Take 1 Tab by mouth every day.             atorvastatin (LIPITOR) 80 MG tablet  Take 1 Tab by mouth every evening.             metFORMIN (GLUCOPHAGE) 500 MG Tab  Take 1 Tab by mouth every day.             tamsulosin (FLOMAX) 0.4 MG capsule  Take 1 Cap by mouth ONE-HALF HOUR AFTER BREAKFAST.

## 2020-08-14 NOTE — PROGRESS NOTES
[facility] Medicine Daily Progress Note    Date of Service  8/14/2020    Chief Complaint  Aphasia.    Hospital Course  70 y.o. male, who is usually wheelchair-bound, with recent admisssion for CVA with residual deficit of right upper extremity weakness and aphasia,  noncompliant, and refused medications, who left AMA during last admission, admitted 7/31/2020 with expressive aphasia, and found in a ditch MRI showed no evidence of acute infarct.  Notably, he was seen by psychiatry previously, and was deemed to have been capacity to make medical decisions however was still discharged then. Reevaluation done by psychiatry on this admission, patient without capacity to make medical and placement decisions.  Brother is now making decisions. PT/OT/SLP recommending long-term placement, and further evaluation deemed him to be at his baseline.  Brother wants him placed in a LTC facility in [place].  Brother wants to pick him up on the 17th and have a place ready for him there in [place].    Interval Problem Update  8/14/2020 - I reviewed the patient's chart today. Uneventful night. VSS. Afebrile. Saturating well on RA.      > I have personally seen and examined the patient today.  He is comfortably sitting in the chair.  No complaints.  No chest pain, shortness of breath, nausea or vomiting.  He is not in pain.          Consultants/Specialty  Psychiatry    Code Status  Full    Disposition  LTC placement in [place].  Brother planning to pick him up on the 17th.  Monitor on the floors.     I certify that the patient requires continued medically necessary hospital services for the treatment of cognitive impairment and history of CVA and will remain in the hospital for 7 days.  Discharge plan is anticipated to be LTC.      Review of Systems  ROS     Pertinent positives/negatives as mentioned above.     A complete review of systems was personally done by me. All other systems were negative.       Physical Exam  Temp:  [36.4 °C (97.5  °F)-36.7 °C (98.1 °F)] 36.6 °C (97.9 °F)  Pulse:  [65-75] 65  Resp:  [16-18] 16  BP: (103-130)/(60-83) 130/83  SpO2:  [97 %-99 %] 97 %    Physical Exam  Vitals signs and nursing note reviewed.   Constitutional:       General: He is not in acute distress.     Appearance: Normal appearance. He is not toxic-appearing or diaphoretic.      Comments: Wheelchair-bound.   HENT:      Head: Normocephalic and atraumatic.      Right Ear: External ear normal.      Left Ear: External ear normal.      Nose: No congestion.      Mouth/Throat:      Mouth: Mucous membranes are moist.      Pharynx: No oropharyngeal exudate.   Eyes:      General: No scleral icterus.     Extraocular Movements: Extraocular movements intact.      Conjunctiva/sclera: Conjunctivae normal.      Pupils: Pupils are equal, round, and reactive to light.   Neck:      Musculoskeletal: Normal range of motion and neck supple. No muscular tenderness.   Cardiovascular:      Rate and Rhythm: Normal rate and regular rhythm.      Heart sounds: Normal heart sounds. No murmur. No gallop.    Pulmonary:      Effort: Pulmonary effort is normal. No respiratory distress.      Breath sounds: Normal breath sounds. No stridor. No wheezing, rhonchi or rales.   Chest:      Chest wall: No tenderness.   Abdominal:      General: Bowel sounds are normal. There is no distension.      Palpations: Abdomen is soft. There is no mass.      Tenderness: There is no abdominal tenderness. There is no guarding or rebound.   Musculoskeletal: Normal range of motion.         General: No swelling.      Right lower leg: No edema.      Left lower leg: No edema.   Lymphadenopathy:      Cervical: No cervical adenopathy.   Skin:     General: Skin is warm and dry.      Coloration: Skin is not jaundiced.      Findings: No rash.   Neurological:      General: No focal deficit present.      Mental Status: He is alert and oriented to person, place, and time.      Cranial Nerves: Dysarthria present. No cranial  nerve deficit.      Comments: Right upper extremity weakness.  Baseline aphasia.   Psychiatric:         Mood and Affect: Mood normal.         Behavior: Behavior normal.         Thought Content: Thought content normal.         Judgment: Judgment normal.         I have performed the physical examination today 8/14/2020.  In review of yesterday's note, there are no new changes except as documented above.    Fluids    Intake/Output Summary (Last 24 hours) at 8/14/2020 0753  Last data filed at 8/14/2020 0600  Gross per 24 hour   Intake 820 ml   Output --   Net 820 ml       Laboratory                        Imaging  EC-SHILPA W/O CONT   Final Result      MR-BRAIN-W/O   Final Result      1.  No evidence of acute territorial infarct, intracranial hemorrhage or mass lesion.   2.  Chronic left middle cerebral artery territory infarct.   3.  Slow flow or occlusion of the left middle cerebral artery M1 and M2 segments.   4.  Mild diffuse cerebral substance loss.   5.  Mild microangiopathic ischemic change versus demyelination or gliosis.   6.  Left maxillary sinusitis.      EC-ECHOCARDIOGRAM COMPLETE W/O CONT   Final Result      CV-FTFOQBO-4 VIEW   Final Result      No metallic density identified in the abdomen or pelvis.      DX-CHEST-PORTABLE (1 VIEW)   Final Result      No acute cardiopulmonary abnormality.      CT-CTA NECK WITH & W/O-POST PROCESSING   Final Result      1.  Scattered atherosclerotic plaque within the bilateral common and internal carotid arteries with less than 50% stenosis. Probable small ulcerated plaque at the left carotid bifurcation.   2.  Mild narrowing within the right subclavian artery and at the left vertebral artery origin.      CT-CTA HEAD WITH & W/O-POST PROCESS   Final Result      1.  Occlusion at the left MCA origin which may be chronic due to chronic infarcts of the left temporal lobe and basal ganglia.   2.  There is flow in more distal left MCA branches likely due to pial collaterals.   3.  15  mm enhancing lesion in the floor of the right middle cranial fossa is likely extra-axial and probably a small meningioma. Brain MRI may be performed for further evaluation.      CT-HEAD W/O   Final Result      1.  Chronic ischemic changes.   2.  No acute intracranial abnormality.   3.  Left maxillary sinus disease.           Assessment/Plan  * History of CVA (cerebrovascular accident)- (present on admission)  Assessment & Plan  - continue ASA, Lipitor. Continue PT/OT/SLP .      Cognitive impairment- (present on admission)  Assessment & Plan  - per psychiatry, pt is INCAPACITATED to make medical and placement decisions.  Brother is now making decisions.  Per CM/SW, no need for guardianship right now, as patient is amenable to going to a long-term facility in [place].  They will have to formalize the brother being the POA once he is in [place].    Urinary retention  Assessment & Plan  - continue Flomax.    Dysphagia- (present on admission)  Assessment & Plan  - continue diet per SLP (texture modifier level 6, liquid level 0). SLP following.     Noncompliance- (present on admission)  Assessment & Plan  - Psychiatry - pt is INCAPACITATED  to make medical and placement decisions 8/4/2020.    Prediabetes- (present on admission)  Assessment & Plan  - maintain on diabetic diet. Continue metformin.        VTE prophylaxis: Lovenox    I have performed the physical examination, and reviewed updated ROS and plan today 8/14/2020.  In review of yesterday's note, there are no new changes except as documented above.

## 2020-08-15 PROCEDURE — 700102 HCHG RX REV CODE 250 W/ 637 OVERRIDE(OP): Performed by: FAMILY MEDICINE

## 2020-08-15 PROCEDURE — 700102 HCHG RX REV CODE 250 W/ 637 OVERRIDE(OP): Performed by: INTERNAL MEDICINE

## 2020-08-15 PROCEDURE — A9270 NON-COVERED ITEM OR SERVICE: HCPCS | Performed by: FAMILY MEDICINE

## 2020-08-15 PROCEDURE — G0378 HOSPITAL OBSERVATION PER HR: HCPCS

## 2020-08-15 PROCEDURE — A9270 NON-COVERED ITEM OR SERVICE: HCPCS | Performed by: INTERNAL MEDICINE

## 2020-08-15 PROCEDURE — 99224 PR SUBSEQUENT OBSERVATION CARE,LEVEL I: CPT | Performed by: INTERNAL MEDICINE

## 2020-08-15 PROCEDURE — 700111 HCHG RX REV CODE 636 W/ 250 OVERRIDE (IP): Performed by: INTERNAL MEDICINE

## 2020-08-15 RX ADMIN — TAMSULOSIN HYDROCHLORIDE 0.4 MG: 0.4 CAPSULE ORAL at 09:33

## 2020-08-15 RX ADMIN — METFORMIN HYDROCHLORIDE 500 MG: 500 TABLET ORAL at 05:03

## 2020-08-15 RX ADMIN — ENOXAPARIN SODIUM 40 MG: 40 INJECTION SUBCUTANEOUS at 05:03

## 2020-08-15 RX ADMIN — ATORVASTATIN CALCIUM 80 MG: 80 TABLET, FILM COATED ORAL at 17:06

## 2020-08-15 RX ADMIN — ASPIRIN 325 MG: 325 TABLET, FILM COATED ORAL at 05:03

## 2020-08-15 ASSESSMENT — FIBROSIS 4 INDEX: FIB4 SCORE: 0.94

## 2020-08-15 NOTE — CARE PLAN
Problem: Nutritional:  Goal: Dysphagia will improve  Outcome: PROGRESSING AS EXPECTED   Patient on regular, thin liquid diet  Problem: Communication:  Goal: The ability to communicate needs accurately and effectively will improve  Outcome: PROGRESSING SLOWER THAN EXPECTED   Patient encouraged to use call light to make needs known. Patient aphasic, answers well to yes/no questions

## 2020-08-15 NOTE — PROGRESS NOTES
[facility] Medicine Daily Progress Note    Date of Service  8/15/2020    Chief Complaint  Aphasia.    Hospital Course  70 y.o. male, who is usually wheelchair-bound, with recent admisssion for CVA with residual deficit of right upper extremity weakness and aphasia,  noncompliant, and refused medications, who left AMA during last admission, admitted 7/31/2020 with expressive aphasia, and found in a ditch MRI showed no evidence of acute infarct.  Notably, he was seen by psychiatry previously, and was deemed to have been capacity to make medical decisions however was still discharged then. Reevaluation done by psychiatry on this admission, patient without capacity to make medical and placement decisions.  Brother is now making decisions. PT/OT/SLP recommending long-term placement, and further evaluation deemed him to be at his baseline.  Brother wants him placed in a LTC facility in [place].  Brother wants to pick him up on the 17th and have a place ready for him there in [place].    Interval Problem Update  8/15/2020 - I reviewed the patient's chart. There were no significant overnight events. Remains hemodynamically stable and afebrile. Stable on RA.  No new labs.  Discharge medications delivered by meds to beds.  Brother tentatively scheduled to pick him up on Monday.    > I have personally seen and examined the patient today.  He has no complaints.  He is comfortably sitting in a chair.  No pain.        Consultants/Specialty  Psychiatry    Code Status  Full    Disposition  LTC placement in [place].  Brother planning to pick him up on the 17th.  Monitor on the floors.         Review of Systems  ROS     Pertinent positives/negatives as mentioned above.     A complete review of systems was personally done by me. All other systems were negative.       Physical Exam  Temp:  [36.1 °C (97 °F)-36.7 °C (98 °F)] 36.1 °C (97 °F)  Pulse:  [54-82] 54  Resp:  [16-18] 18  BP: ()/(58-83) 124/72  SpO2:  [95 %-99 %] 96  %    Physical Exam  Vitals signs and nursing note reviewed.   Constitutional:       General: He is not in acute distress.     Appearance: Normal appearance. He is not toxic-appearing or diaphoretic.      Comments: Wheelchair-bound.   HENT:      Head: Normocephalic and atraumatic.      Right Ear: External ear normal.      Left Ear: External ear normal.      Nose: No congestion.      Mouth/Throat:      Mouth: Mucous membranes are moist.      Pharynx: No oropharyngeal exudate.   Eyes:      General: No scleral icterus.     Extraocular Movements: Extraocular movements intact.      Conjunctiva/sclera: Conjunctivae normal.      Pupils: Pupils are equal, round, and reactive to light.   Neck:      Musculoskeletal: Normal range of motion and neck supple. No muscular tenderness.   Cardiovascular:      Rate and Rhythm: Normal rate and regular rhythm.      Heart sounds: Normal heart sounds. No murmur. No gallop.    Pulmonary:      Effort: Pulmonary effort is normal. No respiratory distress.      Breath sounds: Normal breath sounds. No stridor. No wheezing, rhonchi or rales.   Chest:      Chest wall: No tenderness.   Abdominal:      General: Bowel sounds are normal. There is no distension.      Palpations: Abdomen is soft. There is no mass.      Tenderness: There is no abdominal tenderness. There is no guarding or rebound.   Musculoskeletal: Normal range of motion.         General: No swelling.      Right lower leg: No edema.      Left lower leg: No edema.   Lymphadenopathy:      Cervical: No cervical adenopathy.   Skin:     General: Skin is warm and dry.      Coloration: Skin is not jaundiced.      Findings: No rash.   Neurological:      General: No focal deficit present.      Mental Status: He is alert and oriented to person, place, and time.      Cranial Nerves: Dysarthria present. No cranial nerve deficit.      Comments: Right upper extremity weakness.  Baseline aphasia.   Psychiatric:         Mood and Affect: Mood normal.          Behavior: Behavior normal.         Thought Content: Thought content normal.         Judgment: Judgment normal.         I have performed the physical examination today 8/15/2020.  In review of yesterday's note, there are no new changes except as documented above.      Fluids    Intake/Output Summary (Last 24 hours) at 8/15/2020 0738  Last data filed at 8/14/2020 2000  Gross per 24 hour   Intake 50 ml   Output --   Net 50 ml       Laboratory                        Imaging  EC-SHILPA W/O CONT   Final Result      MR-BRAIN-W/O   Final Result      1.  No evidence of acute territorial infarct, intracranial hemorrhage or mass lesion.   2.  Chronic left middle cerebral artery territory infarct.   3.  Slow flow or occlusion of the left middle cerebral artery M1 and M2 segments.   4.  Mild diffuse cerebral substance loss.   5.  Mild microangiopathic ischemic change versus demyelination or gliosis.   6.  Left maxillary sinusitis.      EC-ECHOCARDIOGRAM COMPLETE W/O CONT   Final Result      RO-TGLCLDU-2 VIEW   Final Result      No metallic density identified in the abdomen or pelvis.      DX-CHEST-PORTABLE (1 VIEW)   Final Result      No acute cardiopulmonary abnormality.      CT-CTA NECK WITH & W/O-POST PROCESSING   Final Result      1.  Scattered atherosclerotic plaque within the bilateral common and internal carotid arteries with less than 50% stenosis. Probable small ulcerated plaque at the left carotid bifurcation.   2.  Mild narrowing within the right subclavian artery and at the left vertebral artery origin.      CT-CTA HEAD WITH & W/O-POST PROCESS   Final Result      1.  Occlusion at the left MCA origin which may be chronic due to chronic infarcts of the left temporal lobe and basal ganglia.   2.  There is flow in more distal left MCA branches likely due to pial collaterals.   3.  15 mm enhancing lesion in the floor of the right middle cranial fossa is likely extra-axial and probably a small meningioma. Brain MRI may  be performed for further evaluation.      CT-HEAD W/O   Final Result      1.  Chronic ischemic changes.   2.  No acute intracranial abnormality.   3.  Left maxillary sinus disease.           Assessment/Plan  * History of CVA (cerebrovascular accident)- (present on admission)  Assessment & Plan  - continue ASA, Lipitor. Continue PT/OT/SLP .      Cognitive impairment- (present on admission)  Assessment & Plan  - per psychiatry, pt is INCAPACITATED to make medical and placement decisions.  Brother is now making decisions.  Per CM/SW, no need for guardianship right now, as patient is amenable to going to a long-term facility in [place].  They will have to formalize the brother being the POA once he is in [place].    Urinary retention  Assessment & Plan  - continue Flomax.    Dysphagia- (present on admission)  Assessment & Plan  - continue diet per SLP (texture modifier level 6, liquid level 0). SLP following.     Noncompliance- (present on admission)  Assessment & Plan  - Psychiatry - pt is INCAPACITATED  to make medical and placement decisions 8/4/2020.    Prediabetes- (present on admission)  Assessment & Plan  - maintain on diabetic diet. Continue metformin.        VTE prophylaxis: Lovenox    I have performed the physical examination today 8/15/2020.  In review of yesterday's note, there are no new changes except as documented above.

## 2020-08-15 NOTE — CARE PLAN
Problem: Communication  Goal: The ability to communicate needs accurately and effectively will improve  Outcome: PROGRESSING AS EXPECTED  Intervention: Develop alternate methods of communication with patient and significant other/support system  Note: A/Ox4, expressive aphasia.      Problem: Safety  Goal: Will remain free from injury  Outcome: PROGRESSING AS EXPECTED  Intervention: Educate patient and significant other/support system about adaptive mobility strategies and safe transfers  Note: WC bound at baseline. R sided weakness, RUE contracture. Call light and personal belongings within reach, able to make needs known. No impulsive behavior noted so far during shift. Hourly rounding, fall precautions, safety maintained. WCTM       Problem: Discharge Barriers/Planning  Goal: Patient's continuum of care needs will be met  Outcome: PROGRESSING AS EXPECTED     Problem: Skin Integrity  Goal: Risk for impaired skin integrity will decrease  Outcome: PROGRESSING AS EXPECTED     Problem: Safety:  Goal: Will remain free from injury  Outcome: PROGRESSING AS EXPECTED  Intervention: Educate patient and significant other/support system about adaptive mobility strategies and safe transfers  Note: WC bound at baseline. R sided weakness, RUE contracture. Call light and personal belongings within reach, able to make needs known. No impulsive behavior noted so far during shift. Hourly rounding, fall precautions, safety maintained. WCTM

## 2020-08-16 PROCEDURE — G0378 HOSPITAL OBSERVATION PER HR: HCPCS

## 2020-08-16 PROCEDURE — 99224 PR SUBSEQUENT OBSERVATION CARE,LEVEL I: CPT | Performed by: INTERNAL MEDICINE

## 2020-08-16 PROCEDURE — 700102 HCHG RX REV CODE 250 W/ 637 OVERRIDE(OP): Performed by: INTERNAL MEDICINE

## 2020-08-16 PROCEDURE — 700111 HCHG RX REV CODE 636 W/ 250 OVERRIDE (IP): Performed by: INTERNAL MEDICINE

## 2020-08-16 PROCEDURE — A9270 NON-COVERED ITEM OR SERVICE: HCPCS | Performed by: INTERNAL MEDICINE

## 2020-08-16 RX ADMIN — ENOXAPARIN SODIUM 40 MG: 40 INJECTION SUBCUTANEOUS at 04:25

## 2020-08-16 NOTE — PROGRESS NOTES
Aaox4.pleasant with some expressive aphasia.OOB to wheelchair independently.eager for d/c to [place] with brother.patient able to sleep.continuing hourly rounding.

## 2020-08-16 NOTE — CARE PLAN
Problem: Safety  Goal: Will remain free from injury  Outcome: PROGRESSING AS EXPECTED  Intervention: Provide assistance with mobility  Note: No assistance needed, free from fall     Problem: Venous Thromboembolism (VTW)/Deep Vein Thrombosis (DVT) Prevention:  Goal: Patient will participate in Venous Thrombosis (VTE)/Deep Vein Thrombosis (DVT)Prevention Measures  Outcome: PROGRESSING AS EXPECTED  Intervention: Assess and monitor for anticoagulation complications  Note: No s/s of complication noted,      Problem: Safety:  Goal: Will remain free from injury  Outcome: PROGRESSING AS EXPECTED  Intervention: Provide assistance with mobility  Note: No assistance needed, free from fall

## 2020-08-16 NOTE — PROGRESS NOTES
Pt refused bed alarm, education given regarding important of fall prevention, pt cont to refuse, charge RN notified.

## 2020-08-16 NOTE — PROGRESS NOTES
[facility] Medicine Daily Progress Note    Date of Service  8/16/2020    Chief Complaint  Aphasia.    Hospital Course  70 y.o. male, who is usually wheelchair-bound, with recent admisssion for CVA with residual deficit of right upper extremity weakness and aphasia,  noncompliant, and refused medications, who left AMA during last admission, admitted 7/31/2020 with expressive aphasia, and found in a ditch MRI showed no evidence of acute infarct.  Notably, he was seen by psychiatry previously, and was deemed to have been capacity to make medical decisions however was still discharged then. Reevaluation done by psychiatry on this admission, patient without capacity to make medical and placement decisions.  Brother is now making decisions. PT/OT/SLP recommending long-term placement, and further evaluation deemed him to be at his baseline.  Brother wants him placed in a LTC facility in [place].  Brother wants to pick him up on the 17th and have a place ready for him there in [place].    Interval Problem Update  8/16/2020 - I reviewed the patient's chart today. Uneventful night. VSS. Afebrile. Saturating well on RA.      > I have personally seen and examined the patient today.  He is eager to go home tomorrow.  She has no complaints.  No pain.  No shortness of breath.  No nausea or vomiting.        Consultants/Specialty  Psychiatry    Code Status  Full    Disposition  LTC placement in [place].  Brother planning to pick him up on the 17th.  Monitor on the floors.         Review of Systems  ROS     Pertinent positives/negatives as mentioned above.     A complete review of systems was personally done by me. All other systems were negative.       Physical Exam  Temp:  [36.1 °C (97 °F)-36.7 °C (98.1 °F)] 36.3 °C (97.4 °F)  Pulse:  [57-83] 57  Resp:  [16-17] 16  BP: (109-135)/(69-78) 135/78  SpO2:  [96 %-98 %] 97 %    Physical Exam  Vitals signs and nursing note reviewed.   Constitutional:       General: He is not in acute  distress.     Appearance: Normal appearance. He is not toxic-appearing or diaphoretic.      Comments: Wheelchair-bound.   HENT:      Head: Normocephalic and atraumatic.      Right Ear: External ear normal.      Left Ear: External ear normal.      Nose: No congestion.      Mouth/Throat:      Mouth: Mucous membranes are moist.      Pharynx: No oropharyngeal exudate.   Eyes:      General: No scleral icterus.     Extraocular Movements: Extraocular movements intact.      Conjunctiva/sclera: Conjunctivae normal.      Pupils: Pupils are equal, round, and reactive to light.   Neck:      Musculoskeletal: Normal range of motion and neck supple. No muscular tenderness.   Cardiovascular:      Rate and Rhythm: Normal rate and regular rhythm.      Heart sounds: Normal heart sounds. No murmur. No gallop.    Pulmonary:      Effort: Pulmonary effort is normal. No respiratory distress.      Breath sounds: Normal breath sounds. No stridor. No wheezing, rhonchi or rales.   Chest:      Chest wall: No tenderness.   Abdominal:      General: Bowel sounds are normal. There is no distension.      Palpations: Abdomen is soft. There is no mass.      Tenderness: There is no abdominal tenderness. There is no guarding or rebound.   Musculoskeletal: Normal range of motion.         General: No swelling.      Right lower leg: No edema.      Left lower leg: No edema.   Lymphadenopathy:      Cervical: No cervical adenopathy.   Skin:     General: Skin is warm and dry.      Coloration: Skin is not jaundiced.      Findings: No rash.   Neurological:      General: No focal deficit present.      Mental Status: He is alert and oriented to person, place, and time.      Cranial Nerves: Dysarthria present. No cranial nerve deficit.      Comments: Right upper extremity weakness.  Baseline aphasia.   Psychiatric:         Mood and Affect: Mood normal.         Behavior: Behavior normal.         Thought Content: Thought content normal.         Judgment: Judgment  normal.         I have performed the physical examination today 8/16/2020.  In review of yesterday's note, there are no new changes except as documented above.      Fluids  No intake or output data in the 24 hours ending 08/16/20 0740    Laboratory                        Imaging  EC-SHILPA W/O CONT   Final Result      MR-BRAIN-W/O   Final Result      1.  No evidence of acute territorial infarct, intracranial hemorrhage or mass lesion.   2.  Chronic left middle cerebral artery territory infarct.   3.  Slow flow or occlusion of the left middle cerebral artery M1 and M2 segments.   4.  Mild diffuse cerebral substance loss.   5.  Mild microangiopathic ischemic change versus demyelination or gliosis.   6.  Left maxillary sinusitis.      EC-ECHOCARDIOGRAM COMPLETE W/O CONT   Final Result      [id number] VIEW   Final Result      No metallic density identified in the abdomen or pelvis.      DX-CHEST-PORTABLE (1 VIEW)   Final Result      No acute cardiopulmonary abnormality.      CT-CTA NECK WITH & W/O-POST PROCESSING   Final Result      1.  Scattered atherosclerotic plaque within the bilateral common and internal carotid arteries with less than 50% stenosis. Probable small ulcerated plaque at the left carotid bifurcation.   2.  Mild narrowing within the right subclavian artery and at the left vertebral artery origin.      CT-CTA HEAD WITH & W/O-POST PROCESS   Final Result      1.  Occlusion at the left MCA origin which may be chronic due to chronic infarcts of the left temporal lobe and basal ganglia.   2.  There is flow in more distal left MCA branches likely due to pial collaterals.   3.  15 mm enhancing lesion in the floor of the right middle cranial fossa is likely extra-axial and probably a small meningioma. Brain MRI may be performed for further evaluation.      CT-HEAD W/O   Final Result      1.  Chronic ischemic changes.   2.  No acute intracranial abnormality.   3.  Left maxillary sinus disease.            Assessment/Plan  * History of CVA (cerebrovascular accident)- (present on admission)  Assessment & Plan  - continue ASA, Lipitor. Continue PT/OT/SLP .      Cognitive impairment- (present on admission)  Assessment & Plan  - per psychiatry, pt is INCAPACITATED to make medical and placement decisions.  Brother is now making decisions.  Per CM/SW, no need for guardianship right now, as patient is amenable to going to a long-term facility in [place].  They will have to formalize the brother being the POA once he is in [place].    Urinary retention  Assessment & Plan  - continue Flomax.    Dysphagia- (present on admission)  Assessment & Plan  - continue diet per SLP (texture modifier level 6, liquid level 0). SLP following.     Noncompliance- (present on admission)  Assessment & Plan  - Psychiatry - pt is INCAPACITATED  to make medical and placement decisions 8/4/2020.    Prediabetes- (present on admission)  Assessment & Plan  - maintain on diabetic diet. Continue metformin.        VTE prophylaxis: Lovenox    I have performed the physical examination, and reviewed updated ROS and plan today 8/16/2020.  In review of yesterday's note, there are no new changes except as documented above.

## 2020-08-16 NOTE — CARE PLAN
Problem: Communication  Goal: The ability to communicate needs accurately and effectively will improve  Outcome: PROGRESSING AS EXPECTED     Problem: Safety  Goal: Will remain free from falls  Outcome: PROGRESSING AS EXPECTED     Problem: Discharge Barriers/Planning  Goal: Patient's continuum of care needs will be met  Outcome: PROGRESSING AS EXPECTED

## 2020-08-17 VITALS
HEART RATE: 61 BPM | TEMPERATURE: 98 F | RESPIRATION RATE: 18 BRPM | BODY MASS INDEX: 20.59 KG/M2 | WEIGHT: 169.09 LBS | DIASTOLIC BLOOD PRESSURE: 67 MMHG | HEIGHT: 76 IN | SYSTOLIC BLOOD PRESSURE: 116 MMHG | OXYGEN SATURATION: 99 %

## 2020-08-17 PROCEDURE — 99217 PR OBSERVATION CARE DISCHARGE: CPT | Performed by: INTERNAL MEDICINE

## 2020-08-17 PROCEDURE — G0378 HOSPITAL OBSERVATION PER HR: HCPCS

## 2020-08-17 PROCEDURE — 700111 HCHG RX REV CODE 636 W/ 250 OVERRIDE (IP): Performed by: INTERNAL MEDICINE

## 2020-08-17 PROCEDURE — A9270 NON-COVERED ITEM OR SERVICE: HCPCS | Performed by: INTERNAL MEDICINE

## 2020-08-17 PROCEDURE — 700102 HCHG RX REV CODE 250 W/ 637 OVERRIDE(OP): Performed by: INTERNAL MEDICINE

## 2020-08-17 NOTE — PROGRESS NOTES
[facility] Medicine Daily Progress Note    Date of Service  8/17/2020    Chief Complaint  Aphasia.    Hospital Course  70 y.o. male, who is usually wheelchair-bound, with recent admisssion for CVA with residual deficit of right upper extremity weakness and aphasia,  noncompliant, and refused medications, who left AMA during last admission, admitted 7/31/2020 with expressive aphasia, and found in a ditch MRI showed no evidence of acute infarct.  Notably, he was seen by psychiatry previously, and was deemed to have been capacity to make medical decisions however was still discharged then. Reevaluation done by psychiatry on this admission, patient without capacity to make medical and placement decisions.  Brother is now making decisions. PT/OT/SLP recommending long-term placement, and further evaluation deemed him to be at his baseline.  Brother wants him placed in a LTC facility in [place].  Brother wants to pick him up on the 17th and have a place ready for him there in [place].    Interval Problem Update  8/17/2020 - I reviewed the patient's chart. There were no significant overnight events. Remains hemodynamically stable and afebrile. Stable on RA.  His brother is supposed to pick him up today.    > I have personally seen and examined the patient today.  He is comfortable.  He states he is ready to leave.  He has no complaints.  He's not in any pain.  No shortness of breath.  No nausea or vomiting.  No fevers or chills.  No cough.  No diarrhea.        Consultants/Specialty  Psychiatry    Code Status  Full    Disposition  LTC placement in [place].  Brother planning to pick him up today.          Review of Systems  ROS     Pertinent positives/negatives as mentioned above.     A complete review of systems was personally done by me. All other systems were negative.       Physical Exam  Temp:  [36.2 °C (97.2 °F)-36.6 °C (97.9 °F)] 36.2 °C (97.2 °F)  Pulse:  [49-74] 49  Resp:  [17-18] 18  BP: (121-128)/(66-80)  126/70  SpO2:  [95 %-99 %] 95 %    Physical Exam  Vitals signs and nursing note reviewed.   Constitutional:       General: He is not in acute distress.     Appearance: Normal appearance. He is not toxic-appearing or diaphoretic.      Comments: Wheelchair-bound.   HENT:      Head: Normocephalic and atraumatic.      Right Ear: External ear normal.      Left Ear: External ear normal.      Nose: No congestion.      Mouth/Throat:      Mouth: Mucous membranes are moist.      Pharynx: No oropharyngeal exudate.   Eyes:      General: No scleral icterus.     Extraocular Movements: Extraocular movements intact.      Conjunctiva/sclera: Conjunctivae normal.      Pupils: Pupils are equal, round, and reactive to light.   Neck:      Musculoskeletal: Normal range of motion and neck supple. No muscular tenderness.   Cardiovascular:      Rate and Rhythm: Normal rate and regular rhythm.      Heart sounds: Normal heart sounds. No murmur. No gallop.    Pulmonary:      Effort: Pulmonary effort is normal. No respiratory distress.      Breath sounds: Normal breath sounds. No stridor. No wheezing, rhonchi or rales.   Chest:      Chest wall: No tenderness.   Abdominal:      General: Bowel sounds are normal. There is no distension.      Palpations: Abdomen is soft. There is no mass.      Tenderness: There is no abdominal tenderness. There is no guarding or rebound.   Musculoskeletal: Normal range of motion.         General: No swelling.      Right lower leg: No edema.      Left lower leg: No edema.   Lymphadenopathy:      Cervical: No cervical adenopathy.   Skin:     General: Skin is warm and dry.      Coloration: Skin is not jaundiced.      Findings: No rash.   Neurological:      General: No focal deficit present.      Mental Status: He is alert and oriented to person, place, and time.      Cranial Nerves: Dysarthria present. No cranial nerve deficit.      Comments: Right upper extremity weakness.  Baseline aphasia.   Psychiatric:          Mood and Affect: Mood normal.         Behavior: Behavior normal.         Thought Content: Thought content normal.         Judgment: Judgment normal.         I have performed the physical examination today 8/17/2020.  In review of yesterday's note, there are no new changes except as documented above.      Fluids    Intake/Output Summary (Last 24 hours) at 8/17/2020 0738  Last data filed at 8/16/2020 1241  Gross per 24 hour   Intake 720 ml   Output --   Net 720 ml       Laboratory                        Imaging  EC-SHILPA W/O CONT   Final Result      MR-BRAIN-W/O   Final Result      1.  No evidence of acute territorial infarct, intracranial hemorrhage or mass lesion.   2.  Chronic left middle cerebral artery territory infarct.   3.  Slow flow or occlusion of the left middle cerebral artery M1 and M2 segments.   4.  Mild diffuse cerebral substance loss.   5.  Mild microangiopathic ischemic change versus demyelination or gliosis.   6.  Left maxillary sinusitis.      EC-ECHOCARDIOGRAM COMPLETE W/O CONT   Final Result      [id number] VIEW   Final Result      No metallic density identified in the abdomen or pelvis.      DX-CHEST-PORTABLE (1 VIEW)   Final Result      No acute cardiopulmonary abnormality.      CT-CTA NECK WITH & W/O-POST PROCESSING   Final Result      1.  Scattered atherosclerotic plaque within the bilateral common and internal carotid arteries with less than 50% stenosis. Probable small ulcerated plaque at the left carotid bifurcation.   2.  Mild narrowing within the right subclavian artery and at the left vertebral artery origin.      CT-CTA HEAD WITH & W/O-POST PROCESS   Final Result      1.  Occlusion at the left MCA origin which may be chronic due to chronic infarcts of the left temporal lobe and basal ganglia.   2.  There is flow in more distal left MCA branches likely due to pial collaterals.   3.  15 mm enhancing lesion in the floor of the right middle cranial fossa is likely extra-axial and probably  a small meningioma. Brain MRI may be performed for further evaluation.      CT-HEAD W/O   Final Result      1.  Chronic ischemic changes.   2.  No acute intracranial abnormality.   3.  Left maxillary sinus disease.           Assessment/Plan  * History of CVA (cerebrovascular accident)- (present on admission)  Assessment & Plan  - continue ASA, Lipitor. Continue PT/OT/SLP .      Cognitive impairment- (present on admission)  Assessment & Plan  - per psychiatry, pt is INCAPACITATED to make medical and placement decisions.  Brother is now making decisions.  Per CM/SW, no need for guardianship right now, as patient is amenable to going to a long-term facility in [place].  They will have to formalize the brother being the POA once he is in [place].    Urinary retention  Assessment & Plan  - continue Flomax.    Dysphagia- (present on admission)  Assessment & Plan  - continue diet per SLP (texture modifier level 6, liquid level 0). SLP following.     Noncompliance- (present on admission)  Assessment & Plan  - Psychiatry - pt is INCAPACITATED  to make medical and placement decisions 8/4/2020.    Prediabetes- (present on admission)  Assessment & Plan  - maintain on diabetic diet. Continue metformin.        VTE prophylaxis: Lovenox    I have performed the physical examination, and reviewed updated ROS and plan today 8/17/2020.  In review of yesterday's note, there are no new changes except as documented above.

## 2020-08-17 NOTE — DISCHARGE INSTRUCTIONS
Discharge Instructions    Discharged to home by car with relative. Discharged via wheelchair, hospital escort: Yes.  Special equipment needed: Not Applicable    Be sure to schedule a follow-up appointment with your primary care doctor or any specialists as instructed.     Discharge Plan:   Diet Plan: Discussed  Activity Level: Discussed  Confirmed Follow up Appointment: No (Comments)  Confirmed Symptoms Management: Discussed  Medication Reconciliation Updated: Yes    I understand that a diet low in cholesterol, fat, and sodium is recommended for good health. Unless I have been given specific instructions below for another diet, I accept this instruction as my diet prescription.   Other diet: diabetic      Special Instructions: None    · Is patient discharged on Warfarin / Coumadin?   No     Depression / Suicide Risk    As you are discharged from this [facility] facility, it is important to learn how to keep safe from harming yourself.    Recognize the warning signs:  · Abrupt changes in personality, positive or negative- including increase in energy   · Giving away possessions  · Change in eating patterns- significant weight changes-  positive or negative  · Change in sleeping patterns- unable to sleep or sleeping all the time   · Unwillingness or inability to communicate  · Depression  · Unusual sadness, discouragement and loneliness  · Talk of wanting to die  · Neglect of personal appearance   · Rebelliousness- reckless behavior  · Withdrawal from people/activities they love  · Confusion- inability to concentrate     If you or a loved one observes any of these behaviors or has concerns about self-harm, here's what you can do:  · Talk about it- your feelings and reasons for harming yourself  · Remove any means that you might use to hurt yourself (examples: pills, rope, extension cords, firearm)  · Get professional help from the community (Mental Health, Substance Abuse, psychological counseling)  · Do not be  alone:Call your Safe Contact- someone whom you trust who will be there for you.  · Call your local CRISIS HOTLINE 388-1715 or 345-756-2384  · Call your local Children's Mobile Crisis Response Team [place] (746) 879-8254 or www.[employer]  · Call the toll free National Suicide Prevention Hotlines   · National Suicide Prevention Lifeline 315-905-CKCM (8478)  · National Hope Line Network 800-SUICIDE (076-5493)      Stroke Prevention  Some medical conditions and lifestyle choices can lead to a higher risk for a stroke. You can help to prevent a stroke by making nutrition, lifestyle, and other changes.  What nutrition changes can be made?    · Eat healthy foods.  ? Choose foods that are high in fiber. These include:  § Fresh fruits.  § Fresh vegetables.  § Whole grains.  ? Eat at least 5 or more servings of fruits and vegetables each day. Try to fill half of your plate at each meal with fruits and vegetables.  ? Choose lean protein foods. These include:  § Lowfat (lean) cuts of meat.  § Chicken without skin.  § Fish.  § Tofu.  § Beans.  § Nuts.  ? Eat low-fat dairy products.  ? Avoid foods that:  § Are high in salt (sodium).  § Have saturated fat.  § Have trans fat.  § Have cholesterol.  § Are processed.  § Are premade.  · Follow eating guidelines as told by your doctor. These may include:  ? Reducing how many calories you eat and drink each day.  ? Limiting how much salt you eat or drink each day to 1,500 milligrams (mg).  ? Using only healthy fats for cooking. These include:  § Olive oil.  § Canola oil.  § Sunflower oil.  ? Counting how many carbohydrates you eat and drink each day.  What lifestyle changes can be made?  · Try to stay at a healthy weight. Talk to your doctor about what a good weight is for you.  · Get at least 30 minutes of moderate physical activity at least 5 days a week. This can include:  ? Fast walking.  ? Biking.  ? Swimming.  · Do not use any products that have nicotine or tobacco.  This includes cigarettes and e-cigarettes. If you need help quitting, ask your doctor. Avoid being around tobacco smoke in general.  · Limit how much alcohol you drink to no more than 1 drink a day for nonpregnant women and 2 drinks a day for men. One drink equals 12 oz of beer, 5 oz of wine, or 1½ oz of hard liquor.  · Do not use drugs.  · Avoid taking birth control pills. Talk to your doctor about the risks of taking birth control pills if:  ? You are over 35 years old.  ? You smoke.  ? You get migraines.  ? You have had a blood clot.  What other changes can be made?  · Manage your cholesterol.  ? It is important to eat a healthy diet.  ? If your cholesterol cannot be managed through your diet, you may also need to take medicines. Take medicines as told by your doctor.  · Manage your diabetes.  ? It is important to eat a healthy diet and to exercise regularly.  ? If your blood sugar cannot be managed through diet and exercise, you may need to take medicines. Take medicines as told by your doctor.  · Control your high blood pressure (hypertension).  ? Try to keep your blood pressure below 130/80. This can help lower your risk of stroke.  ? It is important to eat a healthy diet and to exercise regularly.  ? If your blood pressure cannot be managed through diet and exercise, you may need to take medicines. Take medicines as told by your doctor.  ? Ask your doctor if you should check your blood pressure at home.  ? Have your blood pressure checked every year. Do this even if your blood pressure is normal.  · Talk to your doctor about getting checked for a sleep disorder. Signs of this can include:  ? Snoring a lot.  ? Feeling very tired.  · Take over-the-counter and prescription medicines only as told by your doctor. These may include aspirin or blood thinners (antiplatelets or anticoagulants).  · Make sure that any other medical conditions you have are managed.  Where to find more information  · American Stroke  "Association: www.strokeassociation.org  · National Stroke Association: www.stroke.org  Get help right away if:  · You have any symptoms of stroke. \"BE FAST\" is an easy way to remember the main warning signs:  ? B - Balance. Signs are dizziness, sudden trouble walking, or loss of balance.  ? E - Eyes. Signs are trouble seeing or a sudden change in how you see.  ? F - Face. Signs are sudden weakness or loss of feeling of the face, or the face or eyelid drooping on one side.  ? A - Arms. Signs are weakness or loss of feeling in an arm. This happens suddenly and usually on one side of the body.  ? S - Speech. Signs are sudden trouble speaking, slurred speech, or trouble understanding what people say.  ? T - Time. Time to call emergency services. Write down what time symptoms started.  · You have other signs of stroke, such as:  ? A sudden, very bad headache with no known cause.  ? Feeling sick to your stomach (nausea).  ? Throwing up (vomiting).  ? Jerky movements you cannot control (seizure).  These symptoms may represent a serious problem that is an emergency. Do not wait to see if the symptoms will go away. Get medical help right away. Call your local emergency services (911 in the [place] Do not drive yourself to the hospital.  Summary  · You can prevent a stroke by eating healthy, exercising, not smoking, drinking less alcohol, and treating other health problems, such as diabetes, high blood pressure, or high cholesterol.  · Do not use any products that contain nicotine or tobacco, such as cigarettes and e-cigarettes.  · Get help right away if you have any signs or symptoms of a stroke.  This information is not intended to replace advice given to you by your health care provider. Make sure you discuss any questions you have with your health care provider.  Document Released: 06/18/2013 Document Revised: 02/13/2020 Document Reviewed: 03/21/2018  Elsevier Patient Education © 2020 [employer]    "

## 2020-08-17 NOTE — PROGRESS NOTES
Pt refused bed alarm, education given regarding fall prevention, pt cont to refused, charge RN notified.

## 2020-08-17 NOTE — PROGRESS NOTES
Discharging patient home per physician order. Verbalized understanding of discharge instructions and stroke educational handouts. All questions answered. Home medications and belongings with patient at time of discharge. Paperwork place in pt's chart.

## 2020-08-17 NOTE — PROGRESS NOTES
Aaox4. Pleasant with expressive aphasia.eager for possible d/c to [place] with his brother tomorrow.continuing hourly rounding.

## 2020-08-17 NOTE — CARE PLAN
Problem: Safety  Goal: Will remain free from falls  Outcome: PROGRESSING AS EXPECTED     Problem: Mobility  Goal: Risk for activity intolerance will decrease  Outcome: PROGRESSING AS EXPECTED

## 2020-08-17 NOTE — DISCHARGE PLANNING
Anticipated Discharge Disposition:   Home with brother Sid     Action:    Pts brother Sdi and niece here to  patient and take him to [place] for three days for vacation then patient will go to brother's home in [facility].  Pt agreeable.  Dressed self and able to mobilize with his wheel chair without difficulty.    Pt and Sid requesting medical records to be sent to his new address.  Pt signed the ROSS form and it was faxed to [employer] for processing with HIM.    Barriers to Discharge:    None    Plan:    DC

## 2020-08-17 NOTE — DISCHARGE SUMMARY
Discharge Summary    CHIEF COMPLAINT ON ADMISSION  Chief Complaint   Patient presents with   • Neurological Problem       Reason for Admission  [facility]     Admission Date  7/31/2020    CODE STATUS  Full Code    HPI & HOSPITAL COURSE  This is a 70 y.o. male, who is usually wheelchair-bound, with recent admisssion for CVA with residual deficit of right upper extremity weakness and aphasia,  noncompliant, and refused medications, who left A during last admission, admitted 7/31/2020 with expressive aphasia, and found in a ditch MRI showed no evidence of acute infarct.  Notably, he was seen by psychiatry previously, and was deemed to have been capacity to make medical decisions; however was still discharged then. Reevaluation was done by psychiatry on this admission, and he was deemed to be without capacity to make medical and placement decisions.  Brother is now making decisions. PT/OT/SLP recommending long-term placement, and further evaluation deemed him to be at his baseline.  Brother decided to pick him up and bring him to [place].  Otherwise, he remained hemodynamically stable and afebrile, and he remained at his baseline neurologically.    I have personally seen and examined the patient on the day of discharge. With his clinical improvement, he was deemed ready to discharge from the hospital as he did not have any further hospitalization needs.     Therefore, he is discharged in good and stable condition to home with close outpatient follow-up.      Discharge Date  8/17/2020    FOLLOW UP ITEMS POST DISCHARGE  * History of CVA (cerebrovascular accident)- (present on admission)  Assessment & Plan  - continue ASA, Lipitor. Continue PT/OT/SLP .      Cognitive impairment- (present on admission)  Assessment & Plan  - per psychiatry, pt is INCAPACITATED to make medical and placement decisions.  Brother is now making decisions.  Per CM/SW, no need for guardianship right now, as patient is amenable to going to a  long-term facility in [place].  They will have to formalize the brother being the POA once he is in [place].    Urinary retention  Assessment & Plan  - continue Flomax.    Dysphagia- (present on admission)  Assessment & Plan  - continue diet per SLP (texture modifier level 6, liquid level 0). SLP following.     Noncompliance- (present on admission)  Assessment & Plan  - Psychiatry - pt is INCAPACITATED  to make medical and placement decisions 8/4/2020.    Prediabetes- (present on admission)  Assessment & Plan  - maintain on diabetic diet. Continue metformin.             DISCHARGE DIAGNOSES  Principal Problem:    History of CVA (cerebrovascular accident) POA: Yes  Active Problems:    Cognitive impairment POA: Yes    Dysphagia POA: Yes    Urinary retention POA: Clinically Undetermined    Prediabetes POA: Yes    Noncompliance POA: Yes  Resolved Problems:    Encephalopathy acute POA: Yes      FOLLOW UP  No future appointments.  No follow-up provider specified.    MEDICATIONS ON DISCHARGE     Medication List      START taking these medications      Instructions   aspirin 325 MG Tabs  Commonly known as: ASA   Take 1 Tab by mouth every day.  Dose: 325 mg     atorvastatin 80 MG tablet  Commonly known as: LIPITOR   Take 1 Tab by mouth every evening.  Dose: 80 mg     metFORMIN 500 MG Tabs  Commonly known as: GLUCOPHAGE   Take 1 Tab by mouth every day.  Dose: 500 mg     tamsulosin 0.4 MG capsule  Commonly known as: FLOMAX   Take 1 Cap by mouth ONE-HALF HOUR AFTER BREAKFAST.  Dose: 0.4 mg            Allergies  No Known Allergies    DIET  Orders Placed This Encounter   Procedures   • Diet Order Diabetic     Standing Status:   Standing     Number of Occurrences:   1     Order Specific Question:   Diet:     Answer:   Diabetic [3]     Order Specific Question:   Texture Modifier     Answer:   Level 6 - Soft & Bite Sized (Dysphagia 3)     Order Specific Question:   Liquid level     Answer:   Level 0 - Thin       ACTIVITY  As  tolerated.  Weight bearing as tolerated    CONSULTATIONS  Psychiatry    PROCEDURES  As above    LABORATORY  Lab Results   Component Value Date    SODIUM 139 08/10/2020    POTASSIUM 4.2 08/10/2020    CHLORIDE 100 08/10/2020    CO2 28 08/10/2020    GLUCOSE 109 (H) 08/10/2020    BUN 11 08/10/2020    CREATININE 0.68 08/10/2020        Lab Results   Component Value Date    WBC 4.4 (L) 08/07/2020    HEMOGLOBIN 15.8 08/07/2020    HEMATOCRIT 48.7 08/07/2020    PLATELETCT 211 08/07/2020        Total time of the discharge process = 40 minutes.